# Patient Record
Sex: FEMALE | Race: WHITE | NOT HISPANIC OR LATINO | Employment: FULL TIME | URBAN - METROPOLITAN AREA
[De-identification: names, ages, dates, MRNs, and addresses within clinical notes are randomized per-mention and may not be internally consistent; named-entity substitution may affect disease eponyms.]

---

## 2017-01-04 ENCOUNTER — PRENATAL OFFICE VISIT (OUTPATIENT)
Dept: OBGYN | Facility: CLINIC | Age: 37
End: 2017-01-04
Payer: COMMERCIAL

## 2017-01-04 VITALS — SYSTOLIC BLOOD PRESSURE: 122 MMHG | BODY MASS INDEX: 27.97 KG/M2 | WEIGHT: 163 LBS | DIASTOLIC BLOOD PRESSURE: 82 MMHG

## 2017-01-04 DIAGNOSIS — F41.9 ANXIETY: ICD-10-CM

## 2017-01-04 DIAGNOSIS — O09.523 SUPERVISION OF HIGH-RISK PREGNANCY OF ELDERLY MULTIGRAVIDA, THIRD TRIMESTER: Primary | ICD-10-CM

## 2017-01-04 DIAGNOSIS — O98.519 HERPES INFECTION DURING PREGNANCY, ANTEPARTUM: ICD-10-CM

## 2017-01-04 DIAGNOSIS — Z36.85 ANTENATAL SCREENING FOR STREPTOCOCCUS B: ICD-10-CM

## 2017-01-04 DIAGNOSIS — B00.9 HERPES INFECTION DURING PREGNANCY, ANTEPARTUM: ICD-10-CM

## 2017-01-04 PROCEDURE — 99000 SPECIMEN HANDLING OFFICE-LAB: CPT | Performed by: OBSTETRICS & GYNECOLOGY

## 2017-01-04 PROCEDURE — 87653 STREP B DNA AMP PROBE: CPT | Mod: 90 | Performed by: OBSTETRICS & GYNECOLOGY

## 2017-01-04 PROCEDURE — 99207 ZZC PRENATAL VISIT: CPT | Performed by: OBSTETRICS & GYNECOLOGY

## 2017-01-04 RX ORDER — VALACYCLOVIR HYDROCHLORIDE 500 MG/1
TABLET, FILM COATED ORAL
Qty: 30 TABLET | Refills: 0 | Status: SHIPPED | OUTPATIENT
Start: 2017-01-04 | End: 2017-03-01

## 2017-01-04 NOTE — MR AVS SNAPSHOT
"              After Visit Summary   2017    Rosaline Jimenez    MRN: [id number]           Patient Information     Date Of Birth          1980        Visit Information        Provider Department      2017 8:40 AM Anastasiia Hummel MD [facility] [place]        Today's Diagnoses     Supervision of high-risk pregnancy of elderly multigravida, third trimester    -  1      screening for streptococcus B            Follow-ups after your visit        Who to contact     If you have questions or need follow up information about today's clinic visit or your schedule please contact [facility] directly at [phone].  Normal or non-critical lab and imaging results will be communicated to you by [employer], letter or phone within 4 business days after the clinic has received the results. If you do not hear from us within 7 days, please contact the clinic through [employer] or phone. If you have a critical or abnormal lab result, we will notify you by phone as soon as possible.  Submit refill requests through [employer] or call your pharmacy and they will forward the refill request to us. Please allow 3 business days for your refill to be completed.          Additional Information About Your Visit        MyChart Information     [employer] lets you send messages to your doctor, view your test results, renew your prescriptions, schedule appointments and more. To sign up, go to [email or web address] . Click on \"Log in\" on the left side of the screen, which will take you to the Welcome page. Then click on \"Sign up Now\" on the right side of the page.     You will be asked to enter the access code listed below, as well as some personal information. Please follow the directions to create your username and password.     Your access code is: [email or web address]  Expires: 2017 12:11 PM     Your access code will  in 90 days. If you need help or a new code, please call your [facility] or " [phone].        Care EveryWhere ID     This is your Care EveryWhere ID. This could be used by other organizations to access your [facility] medical records  [id number]        Your Vitals Were     Last Period                   04/25/2016            Blood Pressure from Last 3 Encounters:   01/04/17 122/82   12/21/16 108/66   12/07/16 122/60    Weight from Last 3 Encounters:   01/04/17 163 lb (73.936 kg)   12/21/16 161 lb (73.029 kg)   12/07/16 157 lb (71.215 kg)              Today, you had the following     No orders found for display       Primary Care Provider Office Phone # Fax #    Anastasiia Hummel [phone]       [facility] [address] [address]        Thank you!     Thank you for choosing [facility]  for your care. Our goal is always to provide you with excellent care. Hearing back from our patients is one way we can continue to improve our services. Please take a few minutes to complete the written survey that you may receive in the mail after your visit with us. Thank you!             Your Updated Medication List - Protect others around you: Learn how to safely use, store and throw away your medicines at www.disposemymeds.org.          This list is accurate as of: 1/4/17 10:01 AM.  Always use your most recent med list.                   Brand Name Dispense Instructions for use    lidocaine 5 % Patch    LIDODERM    30 patch    Apply up to 3 patches to painful area at once for up to 12 h within a 24 h period.  Remove after 12 hours.       Prenatal Vitamins 28-0.8 MG Tabs          VALTREX 500 MG tablet   Generic drug:  valACYclovir     14 tablet    Take 1 tablet (500 mg) by mouth 2 times daily       ZOLOFT 100 MG tablet   Generic drug:  sertraline     30 tablet    Take 200 mg by mouth daily

## 2017-01-04 NOTE — PROGRESS NOTES
Having a lot of BH now. Had one night that had them q2-6 minutes and very tight and uncomfortable. No VB or LOF but it got them motivated to get some things done and bags packed. Since then just here and there on Bh. Baby is active. The lido patches worked for rib pain and now is fine. Right hip still bothering her but tolerable. Anxiety is still under good control with just the sertraline and no benzos at all. No HAs at all really these last few months so much better this preg than last. GBS done today. CVX is a FT and at first felt very efface but then noted it funnels along the length of the head so thicker than at first appearance. Discussed induction if wanted after 39 weeks. She is somewhat hesitant but Ole really prefers not doing it so will see how she's feeling in another couple of weeks and if cvx is favorable. Return 1 wk

## 2017-01-05 LAB
GP B STREP DNA SPEC QL NAA+PROBE: NORMAL
SPECIMEN SOURCE: NORMAL

## 2017-01-11 ENCOUNTER — TELEPHONE (OUTPATIENT)
Dept: OBGYN | Facility: CLINIC | Age: 37
End: 2017-01-11

## 2017-01-11 NOTE — TELEPHONE ENCOUNTER
Was seen by one of her partners - blood pressure 114/76, cervical 140, -2 ,  hgb 11.1    --- everything going good. Olivier more since her cervical check.   Wants to know if she needs to still come in to clinic today?

## 2017-01-11 NOTE — TELEPHONE ENCOUNTER
Whatever she prefers. i'm totally fine with what she had her partner do and am fine to skip it if she's comfortable with that

## 2017-01-11 NOTE — TELEPHONE ENCOUNTER
Pt is 37 weeks pregnant and she forgot to schedule her appointment with Dr. Hummel. She was worried she would not be able to get in with her as her schedule is busy. 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 Pt states her BP was 114/76, Hgb 11.1, NST reactive with baseline 130bpm, cervical exam 1cm 40% effaced and -2 station. Pt is wanting to inform Dr. Hummel of her exam her partner did, and to see if she still wanted to see her this week. Pt does have appt scheduled for the next two weeks and does plan to come in. Note routed to Dr. Hummel to review and advise.

## 2017-01-13 ENCOUNTER — HOSPITAL ENCOUNTER (INPATIENT)
Facility: CLINIC | Age: 37
LOS: 1 days | Discharge: HOME | End: 2017-01-14
Attending: OBSTETRICS & GYNECOLOGY | Admitting: OBSTETRICS & GYNECOLOGY
Payer: COMMERCIAL

## 2017-01-13 ENCOUNTER — TELEPHONE (OUTPATIENT)
Dept: NURSING | Facility: CLINIC | Age: 37
End: 2017-01-13

## 2017-01-13 ENCOUNTER — ANESTHESIA (OUTPATIENT)
Dept: OBGYN | Facility: CLINIC | Age: 37
End: 2017-01-13
Payer: COMMERCIAL

## 2017-01-13 ENCOUNTER — ANESTHESIA EVENT (OUTPATIENT)
Dept: OBGYN | Facility: CLINIC | Age: 37
End: 2017-01-13
Payer: COMMERCIAL

## 2017-01-13 PROBLEM — Z34.90 PREGNANCY: Status: ACTIVE | Noted: 2017-01-13

## 2017-01-13 LAB
ABO + RH BLD: NORMAL
ABO + RH BLD: NORMAL
BASOPHILS # BLD AUTO: 0 10E9/L (ref 0–0.2)
BASOPHILS NFR BLD AUTO: 0.2 %
DIFFERENTIAL METHOD BLD: NORMAL
EOSINOPHIL # BLD AUTO: 0.1 10E9/L (ref 0–0.7)
EOSINOPHIL NFR BLD AUTO: 1.3 %
ERYTHROCYTE [DISTWIDTH] IN BLOOD BY AUTOMATED COUNT: 14.7 % (ref 10–15)
HCT VFR BLD AUTO: 36.1 % (ref 35–47)
HGB BLD-MCNC: 12.2 G/DL (ref 11.7–15.7)
IMM GRANULOCYTES # BLD: 0 10E9/L (ref 0–0.4)
IMM GRANULOCYTES NFR BLD: 0.3 %
LYMPHOCYTES # BLD AUTO: 1.9 10E9/L (ref 0.8–5.3)
LYMPHOCYTES NFR BLD AUTO: 18.9 %
MCH RBC QN AUTO: 28.4 PG (ref 26.5–33)
MCHC RBC AUTO-ENTMCNC: 33.8 G/DL (ref 31.5–36.5)
MCV RBC AUTO: 84 FL (ref 78–100)
MONOCYTES # BLD AUTO: 0.4 10E9/L (ref 0–1.3)
MONOCYTES NFR BLD AUTO: 3.9 %
NEUTROPHILS # BLD AUTO: 7.6 10E9/L (ref 1.6–8.3)
NEUTROPHILS NFR BLD AUTO: 75.4 %
NRBC # BLD AUTO: 0 10*3/UL
NRBC BLD AUTO-RTO: 0 /100
PLATELET # BLD AUTO: 222 10E9/L (ref 150–450)
RBC # BLD AUTO: 4.3 10E12/L (ref 3.8–5.2)
SPECIMEN EXP DATE BLD: NORMAL
T PALLIDUM IGG+IGM SER QL: NEGATIVE
WBC # BLD AUTO: 10.1 10E9/L (ref 4–11)

## 2017-01-13 PROCEDURE — 86780 TREPONEMA PALLIDUM: CPT | Performed by: OBSTETRICS & GYNECOLOGY

## 2017-01-13 PROCEDURE — 59400 OBSTETRICAL CARE: CPT | Performed by: OBSTETRICS & GYNECOLOGY

## 2017-01-13 PROCEDURE — 85025 COMPLETE CBC W/AUTO DIFF WBC: CPT | Performed by: OBSTETRICS & GYNECOLOGY

## 2017-01-13 PROCEDURE — 25800025 ZZH RX 258: Performed by: OBSTETRICS & GYNECOLOGY

## 2017-01-13 PROCEDURE — 37000011 ZZH ANESTHESIA WARD SERVICE

## 2017-01-13 PROCEDURE — 25000125 ZZHC RX 250: Performed by: OBSTETRICS & GYNECOLOGY

## 2017-01-13 PROCEDURE — 25000132 ZZH RX MED GY IP 250 OP 250 PS 637: Performed by: OBSTETRICS & GYNECOLOGY

## 2017-01-13 PROCEDURE — 3E0R3CZ INTRODUCTION OF REGIONAL ANESTHETIC INTO SPINAL CANAL, PERCUTANEOUS APPROACH: ICD-10-PCS | Performed by: ANESTHESIOLOGY

## 2017-01-13 PROCEDURE — 25000130 H RX MED GY IP 250 OP 259 PS 637: Performed by: OBSTETRICS & GYNECOLOGY

## 2017-01-13 PROCEDURE — 36415 COLL VENOUS BLD VENIPUNCTURE: CPT | Performed by: OBSTETRICS & GYNECOLOGY

## 2017-01-13 PROCEDURE — 25000125 ZZHC RX 250: Performed by: ANESTHESIOLOGY

## 2017-01-13 PROCEDURE — 72200001 ZZH LABOR CARE VAGINAL DELIVERY SINGLE

## 2017-01-13 PROCEDURE — 86900 BLOOD TYPING SEROLOGIC ABO: CPT | Performed by: OBSTETRICS & GYNECOLOGY

## 2017-01-13 PROCEDURE — 12000037 ZZH R&B POSTPARTUM INTERMEDIATE

## 2017-01-13 PROCEDURE — 00HU33Z INSERTION OF INFUSION DEVICE INTO SPINAL CANAL, PERCUTANEOUS APPROACH: ICD-10-PCS | Performed by: ANESTHESIOLOGY

## 2017-01-13 PROCEDURE — 86901 BLOOD TYPING SEROLOGIC RH(D): CPT | Performed by: OBSTETRICS & GYNECOLOGY

## 2017-01-13 RX ORDER — SODIUM CHLORIDE, SODIUM LACTATE, POTASSIUM CHLORIDE, CALCIUM CHLORIDE 600; 310; 30; 20 MG/100ML; MG/100ML; MG/100ML; MG/100ML
INJECTION, SOLUTION INTRAVENOUS CONTINUOUS
Status: DISCONTINUED | OUTPATIENT
Start: 2017-01-13 | End: 2017-01-13

## 2017-01-13 RX ORDER — DIPHENHYDRAMINE HCL 25 MG
50 CAPSULE ORAL
Status: DISCONTINUED | OUTPATIENT
Start: 2017-01-13 | End: 2017-01-14 | Stop reason: HOSPADM

## 2017-01-13 RX ORDER — CARBOPROST TROMETHAMINE 250 UG/ML
250 INJECTION, SOLUTION INTRAMUSCULAR
Status: DISCONTINUED | OUTPATIENT
Start: 2017-01-13 | End: 2017-01-13

## 2017-01-13 RX ORDER — OXYCODONE AND ACETAMINOPHEN 5; 325 MG/1; MG/1
1 TABLET ORAL
Status: DISCONTINUED | OUTPATIENT
Start: 2017-01-13 | End: 2017-01-13

## 2017-01-13 RX ORDER — IBUPROFEN 800 MG/1
800 TABLET, FILM COATED ORAL
Status: DISCONTINUED | OUTPATIENT
Start: 2017-01-13 | End: 2017-01-13

## 2017-01-13 RX ORDER — METHYLERGONOVINE MALEATE 0.2 MG/ML
200 INJECTION INTRAVENOUS
Status: DISCONTINUED | OUTPATIENT
Start: 2017-01-13 | End: 2017-01-13

## 2017-01-13 RX ORDER — ROPIVACAINE HYDROCHLORIDE 2 MG/ML
10 INJECTION, SOLUTION EPIDURAL; INFILTRATION; PERINEURAL ONCE
Status: COMPLETED | OUTPATIENT
Start: 2017-01-13 | End: 2017-01-13

## 2017-01-13 RX ORDER — NALBUPHINE HYDROCHLORIDE 10 MG/ML
2.5-5 INJECTION, SOLUTION INTRAMUSCULAR; INTRAVENOUS; SUBCUTANEOUS EVERY 6 HOURS PRN
Status: DISCONTINUED | OUTPATIENT
Start: 2017-01-13 | End: 2017-01-13

## 2017-01-13 RX ORDER — AMOXICILLIN 250 MG
1-2 CAPSULE ORAL 2 TIMES DAILY
Status: DISCONTINUED | OUTPATIENT
Start: 2017-01-13 | End: 2017-01-14 | Stop reason: HOSPADM

## 2017-01-13 RX ORDER — OXYTOCIN 10 [USP'U]/ML
10 INJECTION, SOLUTION INTRAMUSCULAR; INTRAVENOUS
Status: DISCONTINUED | OUTPATIENT
Start: 2017-01-13 | End: 2017-01-13

## 2017-01-13 RX ORDER — MISOPROSTOL 200 UG/1
400 TABLET ORAL
Status: DISCONTINUED | OUTPATIENT
Start: 2017-01-13 | End: 2017-01-14 | Stop reason: HOSPADM

## 2017-01-13 RX ORDER — ACETAMINOPHEN 325 MG/1
650 TABLET ORAL EVERY 4 HOURS PRN
Status: DISCONTINUED | OUTPATIENT
Start: 2017-01-13 | End: 2017-01-14 | Stop reason: HOSPADM

## 2017-01-13 RX ORDER — IBUPROFEN 400 MG/1
400-800 TABLET, FILM COATED ORAL EVERY 6 HOURS PRN
Status: DISCONTINUED | OUTPATIENT
Start: 2017-01-13 | End: 2017-01-14 | Stop reason: HOSPADM

## 2017-01-13 RX ORDER — PRENATAL VIT/IRON FUM/FOLIC AC 27MG-0.8MG
1 TABLET ORAL DAILY
Status: DISCONTINUED | OUTPATIENT
Start: 2017-01-13 | End: 2017-01-14 | Stop reason: HOSPADM

## 2017-01-13 RX ORDER — OXYTOCIN/0.9 % SODIUM CHLORIDE 30/500 ML
340 PLASTIC BAG, INJECTION (ML) INTRAVENOUS CONTINUOUS PRN
Status: DISCONTINUED | OUTPATIENT
Start: 2017-01-13 | End: 2017-01-14 | Stop reason: HOSPADM

## 2017-01-13 RX ORDER — ONDANSETRON 2 MG/ML
4 INJECTION INTRAMUSCULAR; INTRAVENOUS EVERY 6 HOURS PRN
Status: DISCONTINUED | OUTPATIENT
Start: 2017-01-13 | End: 2017-01-13

## 2017-01-13 RX ORDER — EPHEDRINE SULFATE 50 MG/ML
5 INJECTION, SOLUTION INTRAMUSCULAR; INTRAVENOUS; SUBCUTANEOUS
Status: DISCONTINUED | OUTPATIENT
Start: 2017-01-13 | End: 2017-01-13

## 2017-01-13 RX ORDER — OXYTOCIN/0.9 % SODIUM CHLORIDE 30/500 ML
100-340 PLASTIC BAG, INJECTION (ML) INTRAVENOUS CONTINUOUS PRN
Status: COMPLETED | OUTPATIENT
Start: 2017-01-13 | End: 2017-01-13

## 2017-01-13 RX ORDER — HYDROCORTISONE 2.5 %
CREAM (GRAM) TOPICAL 3 TIMES DAILY PRN
Status: DISCONTINUED | OUTPATIENT
Start: 2017-01-13 | End: 2017-01-14 | Stop reason: HOSPADM

## 2017-01-13 RX ORDER — OXYTOCIN 10 [USP'U]/ML
10 INJECTION, SOLUTION INTRAMUSCULAR; INTRAVENOUS
Status: DISCONTINUED | OUTPATIENT
Start: 2017-01-13 | End: 2017-01-14 | Stop reason: HOSPADM

## 2017-01-13 RX ORDER — NALOXONE HYDROCHLORIDE 0.4 MG/ML
.1-.4 INJECTION, SOLUTION INTRAMUSCULAR; INTRAVENOUS; SUBCUTANEOUS
Status: DISCONTINUED | OUTPATIENT
Start: 2017-01-13 | End: 2017-01-14 | Stop reason: HOSPADM

## 2017-01-13 RX ORDER — NALOXONE HYDROCHLORIDE 0.4 MG/ML
.1-.4 INJECTION, SOLUTION INTRAMUSCULAR; INTRAVENOUS; SUBCUTANEOUS
Status: DISCONTINUED | OUTPATIENT
Start: 2017-01-13 | End: 2017-01-13

## 2017-01-13 RX ORDER — FENTANYL CITRATE 50 UG/ML
50-100 INJECTION, SOLUTION INTRAMUSCULAR; INTRAVENOUS
Status: DISCONTINUED | OUTPATIENT
Start: 2017-01-13 | End: 2017-01-13

## 2017-01-13 RX ORDER — FENTANYL CITRATE 50 UG/ML
100 INJECTION, SOLUTION INTRAMUSCULAR; INTRAVENOUS ONCE
Status: COMPLETED | OUTPATIENT
Start: 2017-01-13 | End: 2017-01-13

## 2017-01-13 RX ORDER — OXYCODONE HYDROCHLORIDE 5 MG/1
5-10 TABLET ORAL
Status: DISCONTINUED | OUTPATIENT
Start: 2017-01-13 | End: 2017-01-14 | Stop reason: HOSPADM

## 2017-01-13 RX ORDER — ACETAMINOPHEN 325 MG/1
650 TABLET ORAL EVERY 4 HOURS PRN
Status: DISCONTINUED | OUTPATIENT
Start: 2017-01-13 | End: 2017-01-13

## 2017-01-13 RX ORDER — OXYTOCIN/0.9 % SODIUM CHLORIDE 30/500 ML
100 PLASTIC BAG, INJECTION (ML) INTRAVENOUS CONTINUOUS
Status: DISCONTINUED | OUTPATIENT
Start: 2017-01-13 | End: 2017-01-14 | Stop reason: HOSPADM

## 2017-01-13 RX ORDER — BISACODYL 10 MG
10 SUPPOSITORY, RECTAL RECTAL DAILY PRN
Status: DISCONTINUED | OUTPATIENT
Start: 2017-01-15 | End: 2017-01-14 | Stop reason: HOSPADM

## 2017-01-13 RX ORDER — LIDOCAINE 40 MG/G
CREAM TOPICAL
Status: DISCONTINUED | OUTPATIENT
Start: 2017-01-13 | End: 2017-01-13

## 2017-01-13 RX ORDER — LANOLIN 100 %
OINTMENT (GRAM) TOPICAL
Status: DISCONTINUED | OUTPATIENT
Start: 2017-01-13 | End: 2017-01-14 | Stop reason: HOSPADM

## 2017-01-13 RX ADMIN — Medication 12 ML/HR: at 11:15

## 2017-01-13 RX ADMIN — IBUPROFEN 800 MG: 400 TABLET ORAL at 22:52

## 2017-01-13 RX ADMIN — SENNOSIDES AND DOCUSATE SODIUM 1 TABLET: 8.6; 5 TABLET ORAL at 20:02

## 2017-01-13 RX ADMIN — FENTANYL CITRATE 100 MCG: 50 INJECTION, SOLUTION INTRAMUSCULAR; INTRAVENOUS at 11:10

## 2017-01-13 RX ADMIN — SODIUM CHLORIDE, POTASSIUM CHLORIDE, SODIUM LACTATE AND CALCIUM CHLORIDE 1000 ML: 600; 310; 30; 20 INJECTION, SOLUTION INTRAVENOUS at 10:25

## 2017-01-13 RX ADMIN — IBUPROFEN 800 MG: 400 TABLET ORAL at 16:59

## 2017-01-13 RX ADMIN — SODIUM CHLORIDE, POTASSIUM CHLORIDE, SODIUM LACTATE AND CALCIUM CHLORIDE: 600; 310; 30; 20 INJECTION, SOLUTION INTRAVENOUS at 11:04

## 2017-01-13 RX ADMIN — DIPHENHYDRAMINE HYDROCHLORIDE 50 MG: 25 CAPSULE ORAL at 23:20

## 2017-01-13 RX ADMIN — Medication 340 ML/HR: at 15:02

## 2017-01-13 RX ADMIN — ACETAMINOPHEN 650 MG: 325 TABLET, FILM COATED ORAL at 22:52

## 2017-01-13 RX ADMIN — ROPIVACAINE HYDROCHLORIDE 10 ML: 2 INJECTION, SOLUTION EPIDURAL; INFILTRATION at 11:10

## 2017-01-13 NOTE — L&D DELIVERY NOTE
of viable female at 1458   Baby's weight 7lbs. 3oz.     Apgars unavailable     Clear AF  Tiny vaginal abrasion not repaired  Normal placenta    Primary OB: Shaheed

## 2017-01-13 NOTE — H&P
No significant change in general health status based on examination of the patient, review of Nursing Admission Database and prenatal record.    EFW: 6lb

## 2017-01-13 NOTE — PLAN OF CARE
1100:  Dr. Latif at bedside for epidural placement.    1230: Dr. Hummel at bedside to see Pt and AROM. Clear fluid noted.    1340: Pt feeling some rectal pressure. SVE 6+/09/-2    1435: Feeling more pressure and starting to breathe with ctxs. SVE: 10/100/0. Dr. Hummel called to attend delivery. Milner removed.    1442: Dr. Hummel at bedside.    1445: Started pushing.    1458:  of baby girl @ 1458 by Dr. Hummel.  RN and MD at bedside during entire pushing time.    1502: Placenta/Oxytocin started.

## 2017-01-13 NOTE — TELEPHONE ENCOUNTER
Pt called and stated she is in labor. Informed pt to go to MAC at [facility]. Dr. Hummel informed. MAC at [facility] Called.

## 2017-01-13 NOTE — ANESTHESIA PROCEDURE NOTES
Peripheral nerve/Neuraxial procedure note : epidural catheter  Pre-Procedure  Performed by IRISH PAULINO  Location: OB      Pre-Anesthestic Checklist: patient identified, IV checked, risks and benefits discussed, informed consent, monitors and equipment checked, pre-op evaluation and at physician/surgeon's request    Timeout  Correct Patient: Yes   Correct Procedure: Yes   Correct Site: Yes   Correct Laterality: N/A   Correct Position: Yes   Site Marked: N/A   .   Procedure Documentation    .    Procedure:    Epidural catheter.  Insertion Site:L3-4  (midline approach) Injection technique: LORT saline   Local skin infiltrated with 3 mL of 1% lidocaine.  ALEYDA at 5 cm     Patient Prep;mask, sterile gloves, povidone-iodine 7.5% surgical scrub, patient draped.  .  Needle: Touhy needle (17 G. 3.5 in). # of attempts: 1. # of redirects:: 0..  Spinal Needle: . . . Catheter: 19 G .  .  .  Catheter threaded easily, 9 at the skin and 4 cm in the epidural space.     Assessment/Narrative  Paresthesias: No.  .  .  Aspiration negative for heme or CSF  . Test dose of 3 mL lidocaine 1.5% w/ 1:200,000 epinephrine at. Test dose negative for signs of intravascular, subdural or intrathecal injection. Comments:  Pt tolerated well.   Immediately to supine with JASON.   FHTs stable post-procedure.   No complications.

## 2017-01-13 NOTE — PLAN OF CARE
Problem: Labor (Cervical Ripen, Induct, Augment) (Adult,Obstetrics,Pediatric)  Goal: Signs and Symptoms of Listed Potential Problems Will be Absent or Manageable (Labor)  Signs and symptoms of listed potential problems will be absent or manageable by discharge/transition of care (reference Labor (Cervical Ripen, Induct, Augment) (Adult,Obstetrics,Pediatric) CPG).  Outcome: Completed Date Met:  17   @ 5127 of baby girl by Dr. Hummel. See delivery record for further details.

## 2017-01-13 NOTE — IP AVS SNAPSHOT
MRN:[id number]                      After Visit Summary   1/13/2017    Rosaline Jimenez    MRN: [id number]           Thank you!     Thank you for choosing [facility] for your care. Our goal is always to provide you with excellent care. Hearing back from our patients is one way we can continue to improve our services. Please take a few minutes to complete the written survey that you may receive in the mail after you visit with us. Thank you!        Patient Information     Date Of Birth          1980        About your hospital stay     You were admitted on:  January 13, 2017 You last received care in the:  [address]    You were discharged on:  January 14, 2017       Who to Call     For medical emergencies, please call 911.  For non-urgent questions about your medical care, please call your primary care provider or clinic, [phone]          Attending Provider     Provider    Anastasiia Hummel MD       Primary Care Provider Office Phone # Fax #    Anastasiia Hummel [phone]       [facility] [address] [place] [address] [address]  [facility] [address]        After Care Instructions     Activity       Review discharge instructions            Diet       Resume previous diet            Discharge Instructions - Postpartum visit       Schedule postpartum visit with your provider and return to clinic in 6 weeks.                  Your next 10 appointments already scheduled     Jan 18, 2017  9:50 AM   ESTABLISHED PRENATAL with Anastasiia Hummel MD   [facility] ([facility])    [address] [place] [address] 100  [facility] [id number] [phone]            Jan 23, 2017  2:50 PM   ESTABLISHED PRENATAL with Anastasiia Hummel MD   [facility] [place] ([facility])    [address] [facility] [address]  [facility] [id number] [phone]              Further instructions from your care team        Postpartum Vaginal Delivery Instructions    Activity       Ask family and friends for help when you need it.    Do not place anything in your vagina for 6 weeks.    You are not restricted on other activities, but take it easy for a few weeks to allow your body to recover from delivery.  You are able to do any activities you feel up to that point.    No driving until you have stopped taking your pain medications (usually two weeks after delivery).     Call your health care provider if you have any of these symptoms:       Increased pain, swelling, redness, or fluid around your stiches from an episiotomy or perineal tear.    A fever above 100.4 F (38 C) with or without chills when placing a thermometer under your tongue.    You soak a sanitary pad with blood within 1 hour, or you see blood clots larger than a golf ball.    Bleeding that lasts more than 6 weeks.    Vaginal discharge that smells bad.    Severe pain, cramping or tenderness in your lower belly area.    A need to urinate more frequently (use the toilet more often), more urgently (use the toilet very quickly), or it burns when you urinate.    Nausea and vomiting.    Redness, swelling or pain around a vein in your leg.    Problems breastfeeding or a red or painful area on your breast.    Chest pain and cough or are gasping for air.    Problems coping with sadness, anxiety, or depression.  If you have any concerns about hurting yourself or the baby, call your provider immediately.     You have questions or concerns after you return home.     Keep your hands clean:  Always wash your hands before touching your perineal area and stitches.  This helps reduce your risk of infection.  If your hands aren't dirty, you may use an alcohol hand-rub to clean your hands. Keep your nails clean and short.        Pending Results     No orders found for last 2 day(s).            Statement of Approval     Ordered          01/14/17 0929  I have reviewed and agree with all the  "recommendations and orders detailed in this document.   EFFECTIVE NOW     Approved and electronically signed by:  Chayo Gilliland MD             Admission Information        Provider Department Dept Phone    2017 Anastasiia Hummel MD  [facility] [phone]      Your Vitals Were     Blood Pressure Temperature Respirations    128/80 mmHg 98.1  F (36.7  C) (Oral) 16    Height Weight BMI (Body Mass Index)    1.626 m (5' 4.02\") 73.936 kg (163 lb) 27.97 kg/m2    Pulse Oximetry Last Period       95% 2016       [employer] Information     [employer] lets you send messages to your doctor, view your test results, renew your prescriptions, schedule appointments and more. To sign up, go to [email or web address] . Click on \"Log in\" on the left side of the screen, which will take you to the Welcome page. Then click on \"Sign up Now\" on the right side of the page.     You will be asked to enter the access code listed below, as well as some personal information. Please follow the directions to create your username and password.     Your access code is: [email or web address]  Expires: 2017 12:11 PM     Your access code will  in 90 days. If you need help or a new code, please call your [facility] clinic or [phone].        Care EveryWhere ID     This is your Care EveryWhere ID. This could be used by other organizations to access your [facility] medical records  [id number]           Review of your medicines      CONTINUE these medicines which have NOT CHANGED        Dose / Directions    lidocaine 5 % Patch   Commonly known as:  LIDODERM   Used for:  Rib pain on right side        Apply up to 3 patches to painful area at once for up to 12 h within a 24 h period.  Remove after 12 hours.   Quantity:  30 patch   Refills:  0       Prenatal Vitamins 28-0.8 MG Tabs        Refills:  0       valACYclovir 500 MG tablet   Commonly known as:  VALTREX   Used for:  Herpes infection during pregnancy, antepartum        Take one tab " po qday   Quantity:  30 tablet   Refills:  0       ZOLOFT 100 MG tablet   Generic drug:  sertraline        Dose:  150 mg   Take 150 mg by mouth daily   Quantity:  30 tablet   Refills:  0                Protect others around you: Learn how to safely use, store and throw away your medicines at www.disposemymeds.org.             Medication List: This is a list of all your medications and when to take them. Check marks below indicate your daily home schedule. Keep this list as a reference.      Medications           Morning Afternoon Evening Bedtime As Needed    lidocaine 5 % Patch   Commonly known as:  LIDODERM   Apply up to 3 patches to painful area at once for up to 12 h within a 24 h period.  Remove after 12 hours.                                Prenatal Vitamins 28-0.8 MG Tabs                                valACYclovir 500 MG tablet   Commonly known as:  VALTREX   Take one tab po qday                                ZOLOFT 100 MG tablet   Take 150 mg by mouth daily   Last time this was given:  150 mg on 1/14/2017  8:14 AM   Generic drug:  sertraline

## 2017-01-13 NOTE — ANESTHESIA PREPROCEDURE EVALUATION
Anesthesia Plan      History & Physical Review      ASA Status:  .  OB Epidural Asa: 2            Postoperative Care      Consents                          .

## 2017-01-13 NOTE — PLAN OF CARE
Pt up to void with success. Pt transferred to [facility] with baby in her arms. Report to ISAIAS Ivey

## 2017-01-13 NOTE — IP AVS SNAPSHOT
[address]    [place] [address]    Phone:  [phone]                                       After Visit Summary   1/13/2017    Rosaline Jimenez    MRN: [id number]           After Visit Summary Signature Page     I have received my discharge instructions, and my questions have been answered. I have discussed any challenges I see with this plan with the nurse or doctor.    ..........................................................................................................................................  Patient/Patient Representative Signature      ..........................................................................................................................................  Patient Representative Print Name and Relationship to Patient    ..................................................               ................................................  Date                                            Time    ..........................................................................................................................................  Reviewed by Signature/Title    ...................................................              ..............................................  Date                                                            Time

## 2017-01-13 NOTE — PLAN OF CARE
Multip here laboring. Pt was 4cm at work before she came over.  EU/Us applied. Admission database obtained, and prenatal info reviewed.    Dr. Hummel updated of Pt's arrival, and orders received.

## 2017-01-14 VITALS
DIASTOLIC BLOOD PRESSURE: 80 MMHG | WEIGHT: 163 LBS | RESPIRATION RATE: 16 BRPM | HEIGHT: 64 IN | BODY MASS INDEX: 27.83 KG/M2 | SYSTOLIC BLOOD PRESSURE: 128 MMHG | OXYGEN SATURATION: 95 % | TEMPERATURE: 98.1 F

## 2017-01-14 PROCEDURE — 25000132 ZZH RX MED GY IP 250 OP 250 PS 637: Performed by: OBSTETRICS & GYNECOLOGY

## 2017-01-14 RX ADMIN — ACETAMINOPHEN 650 MG: 325 TABLET, FILM COATED ORAL at 10:44

## 2017-01-14 RX ADMIN — SERTRALINE HYDROCHLORIDE 150 MG: 50 TABLET ORAL at 08:14

## 2017-01-14 RX ADMIN — ACETAMINOPHEN 650 MG: 325 TABLET, FILM COATED ORAL at 04:30

## 2017-01-14 RX ADMIN — PRENATAL VIT W/ FE FUMARATE-FA TAB 27-0.8 MG 1 TABLET: 27-0.8 TAB at 08:14

## 2017-01-14 RX ADMIN — IBUPROFEN 800 MG: 400 TABLET ORAL at 04:30

## 2017-01-14 RX ADMIN — IBUPROFEN 800 MG: 400 TABLET ORAL at 10:44

## 2017-01-14 NOTE — LACTATION NOTE
Initial Lactation visit. Hand out given. Recommend unlimited, frequent breast feedings: At least 8 - 12 times every 24 hours. Avoid pacifiers and supplementation with formula unless medically indicated. Explained benefits of holding baby skin on skin to help promote better breastfeeding outcomes. Infant feeding well when interested.  Discussed second night with pt and effect supplement can have on milk supply if pt gives supplement as patient was asking about supplementing with cluster feeding.  Pt encouraged to breast feed on demand and feed baby at breast before offering supplement.  Will revisit as needed.    Milagro Mcnamara RN, IBCLC

## 2017-01-14 NOTE — PLAN OF CARE
Problem: Postpartum, Vaginal Delivery (Adult)  Goal: Signs and Symptoms of Listed Potential Problems Will be Absent or Manageable (Postpartum, Vaginal Delivery)  Signs and symptoms of listed potential problems will be absent or manageable by discharge/transition of care (reference Postpartum, Vaginal Delivery (Adult) CPG).   Outcome: Improving  Requesting early discharge today. Minimial c/o discomfort; occasional cramping while nursing. Independent with baby cares and supportive  at bedside. No other c/o. Breastpump given.

## 2017-01-14 NOTE — DISCHARGE INSTRUCTIONS

## 2017-01-14 NOTE — PROGRESS NOTES
[facility]    Post-Partum Progress Note    Assessment and Plan  Assessment:  Post-partum day #1  Normal spontaneous vaginal delivery    Doing well.  No excessive bleeding  Pain well-controlled.    Plan:  Ambulation encouraged  Lactation consultation  Pain control measures as needed  Possible discharge later today; if not, home tomorrow    Chayo Gilliland     Interval History  Doing well.  Pain is well-controlled.  No fevers.  No history of foul-smelling vaginal discharge.  Good appetite.  Denies chest pain, shortness of breath, nausea or vomiting.  Vaginal bleeding is similar to a heavy menstrual flow but slowing.  Ambulatory.  Breastfeeding well.  Had some cramping but tolerable    Medications    oxytocin in 0.9% NaCl       oxytocin in 0.9% NaCl       NO Rho (D) immune globulin (RhoGam) needed - mother Rh POSITIVE       - MEDICATION INSTRUCTIONS -       - MEDICATION INSTRUCTIONS -         prenatal multivitamin  plus iron  1 tablet Oral Daily     sertraline  150 mg Oral Daily     senna-docusate  1-2 tablet Oral BID       Physical Exam  Temp: 98.3  F (36.8  C) Temp src: Oral BP: 114/76 mmHg   Heart Rate: 64 Resp: 16 SpO2: 95 %      Filed Vitals:    01/13/17 1030   Weight: 73.936 kg (163 lb)     Vital Signs with Ranges  Temp:  [98.3  F (36.8  C)-98.8  F (37.1  C)] 98.3  F (36.8  C)  Heart Rate:  [64] 64  Resp:  [16] 16  BP: ()/(56-79) 114/76 mmHg  SpO2:  [94 %-99 %] 95 %  I/O last 3 completed shifts:  In: -   Out: 900 [Urine:600; Blood:300]    Uterine fundus is firm, non-tender and at the level of the umbilicus    Data  Recent Labs   Lab Test  01/13/17   1025  07/06/16   1059   ABO  A  A   RH   Pos   Pos   AS   --   Neg     Recent Labs   Lab Test  01/13/17   1025  11/09/16   0838   HGB  12.2  11.4*     Recent Labs   Lab Test  07/06/16   1059   RUQIGG  65

## 2017-01-14 NOTE — L&D DELIVERY NOTE
HISTORY OF PRESENT ILLNESS:  Rosaline Jimenez is a 36-year-old  2, para 1-0-0-1 whose pregnancy was followed by me.  The patient's pregnancy was complicated by advanced maternal age, but the patient did have Verifi testing done that was normal.  She also had an AFP that was normal.  She is otherwise blood type A positive, rubella immune and group B strep negative.  She has a history of anxiety that was well controlled on 150 mg of sertraline during the pregnancy.  She has had a long history of Klonopin use in the past, used it very sparingly this pregnancy and then stopped it altogether approximately a month or 2 ago because she began to worry about the effects on the baby.  Other than that, the patient does have a history of genital herpes and did take Valtrex starting at 36 weeks' gestation.  Other than that, the patient had an uncomplicated pregnancy.        Earlier this morning, although the exact time is not available, the patient began dustin while at work.  Upon her presentation to Labor & Delivery, she was found to be 4 cm dilated.  That was between 10:00 and 11:00 a.m.  She was admitted in active labor and had an epidural for pain control.  I then came over and did artificial rupture of membranes at 12:35 p.m. with copious clear fluid.  At that time, she was still 4 cm, 85% effaced and -2 station.  She made very rapid progress and became complete, although the time of that is not available.  With excellent maternal pushing efforts, she delivered a viable female infant in the left occiput anterior position.  The infant was initially delivered onto the maternal chest and abdomen, and we did delay cord clamping for approximately 30-45 seconds.  However, the infant was having decreased respiratory drive and therefore was taken to the warmer and the NICU NNP was called.  The infant did require some blow-by oxygen and some bag ventilation, but was otherwise stable.  Apgars are not available at the time of  this dictation, but her weight was 7 pounds 3 ounces.      The placenta then delivered intact with a normal 3-vessel cord at 1502.        The patient had a tiny vaginal abrasion that was not bleeding and therefore not repaired.  She did have some lingering blood clots in the uterus that caused some continued oozing.  A bimanual exploration was done, the blood clots were removed and then the uterine tone improved very much.  The total estimated blood loss was 300 cc.  Both mother and infant were doing well immediately after delivery, and there were no complications.  All sponge, lap and instrument counts were correct x2.      STAGES OF LABOR:  Times are not yet available.      DIAGNOSES:   1.  Intrauterine pregnancy at 37+4 in spontaneous labor.   2.  Epidural for pain control.   3.  Normal spontaneous vaginal delivery of a viable female infant.         SIVA GALARZA MD             D: 2017 15:23   T: 2017 14:26   MT: [id number]      Name:     LEIDA SY   MRN:      [id number]        Account:        [id number]   :      1980           Delivery Date:  2017      Document: [id number]

## 2017-01-14 NOTE — PLAN OF CARE
Problem: Goal Outcome Summary  Goal: Goal Outcome Summary  Outcome: Improving  Patient doing well overnight. Taking tylenol and ibuprofen for uterine cramping. Aqua K pad given for comfort. Hydrocortisone cream provided for hemmorhoid discomfort. Will continue to monitor.    Debbi Live  01/14/2017  4:40 AM

## 2017-01-14 NOTE — ANESTHESIA POSTPROCEDURE EVALUATION
Patient: Rosaline Jimenez    LABOR EPIDURAL  Additional Information* No procedures listed *    Diagnosis:* No pre-op diagnosis entered *  Diagnosis Additional Information: No value filed.    Anesthesia Type:  No value filed.    Note:  Anesthesia Post Evaluation    Patient location during evaluation: floor  Patient participation: Able to fully participate in evaluation  Level of consciousness: awake and alert  Pain management: adequate  Airway patency: patent  Cardiovascular status: acceptable  Respiratory status: acceptable     Anesthetic complications: None    Comments: Epidural has worn off and no complications        Last vitals:  Filed Vitals:    01/13/17 1650 01/13/17 2223 01/14/17 0928   BP: 111/65 114/76 128/80   Temp:  36.8  C (98.3  F) 36.7  C (98.1  F)   Resp:  16 16   SpO2: 95%         Electronically Signed By: Mike Mendieta MD  January 14, 2017  4:06 PM

## 2017-01-14 NOTE — PLAN OF CARE
Problem: Goal Outcome Summary  Goal: Goal Outcome Summary  Outcome: No Change  Bleeding wnl. VSS. Voiding without difficulty. Pain well managed with tylenol / ibuprofen. Plans to discharge this evening.

## 2017-02-24 ENCOUNTER — TELEPHONE (OUTPATIENT)
Dept: NURSING | Facility: CLINIC | Age: 37
End: 2017-02-24

## 2017-02-25 NOTE — TELEPHONE ENCOUNTER
Call Type: Triage Call    Presenting Problem: Pt calling she delivered on 1/13/17.  She is  nursing.  Today she developed chills, body aches and pains and now  has a temp of 102.1.  She reports both breasts are sore and tender.  Paged on call Dr. Anastasiia Hummel via [employer] at 8:27 P.M.  Triage Note:  Guideline Title: Pregnancy: Postpartum Breast Symptoms ; Pregnancy:  Postpartum Breast Symptoms  Recommended Disposition: See Provider within 4 hours  Original Inclination: Would have called clinic  Override Disposition: Call Provider Immediately  Intended Action: Call PCP/HCP  Physician Contacted: No  New onset or increasing redness, tenderness, localized warmth or swelling ?  YES  More than six weeks postpartum AND not breastfeeding ? NO  Physician Instructions:  Care Advice: List, or take, all current prescription(s), nonprescription or  alternative medication(s) to provider for evaluation.

## 2017-03-01 ENCOUNTER — PRENATAL OFFICE VISIT (OUTPATIENT)
Dept: OBGYN | Facility: CLINIC | Age: 37
End: 2017-03-01
Payer: COMMERCIAL

## 2017-03-01 VITALS
DIASTOLIC BLOOD PRESSURE: 56 MMHG | SYSTOLIC BLOOD PRESSURE: 102 MMHG | WEIGHT: 138.6 LBS | TEMPERATURE: 97.8 F | BODY MASS INDEX: 23.66 KG/M2 | HEIGHT: 64 IN

## 2017-03-01 DIAGNOSIS — F41.9 ANXIETY: ICD-10-CM

## 2017-03-01 PROBLEM — Z34.90 PREGNANCY: Status: RESOLVED | Noted: 2017-01-13 | Resolved: 2017-03-01

## 2017-03-01 PROBLEM — O98.519 HERPES INFECTION DURING PREGNANCY, ANTEPARTUM: Status: RESOLVED | Noted: 2017-01-04 | Resolved: 2017-03-01

## 2017-03-01 PROBLEM — A60.04 HERPES SIMPLEX VULVOVAGINITIS: Status: ACTIVE | Noted: 2017-03-01

## 2017-03-01 PROBLEM — B00.9 HERPES INFECTION DURING PREGNANCY, ANTEPARTUM: Status: RESOLVED | Noted: 2017-01-04 | Resolved: 2017-03-01

## 2017-03-01 PROCEDURE — 99207 ZZC POST PARTUM EXAM: CPT | Performed by: OBSTETRICS & GYNECOLOGY

## 2017-03-01 RX ORDER — SERTRALINE HYDROCHLORIDE 100 MG/1
100 TABLET, FILM COATED ORAL DAILY
Qty: 30 TABLET | COMMUNITY
Start: 2017-03-01 | End: 2023-09-13

## 2017-03-01 ASSESSMENT — ANXIETY QUESTIONNAIRES
IF YOU CHECKED OFF ANY PROBLEMS ON THIS QUESTIONNAIRE, HOW DIFFICULT HAVE THESE PROBLEMS MADE IT FOR YOU TO DO YOUR WORK, TAKE CARE OF THINGS AT HOME, OR GET ALONG WITH OTHER PEOPLE: SOMEWHAT DIFFICULT
1. FEELING NERVOUS, ANXIOUS, OR ON EDGE: SEVERAL DAYS
7. FEELING AFRAID AS IF SOMETHING AWFUL MIGHT HAPPEN: NOT AT ALL
6. BECOMING EASILY ANNOYED OR IRRITABLE: NOT AT ALL
2. NOT BEING ABLE TO STOP OR CONTROL WORRYING: NOT AT ALL
GAD7 TOTAL SCORE: 7
5. BEING SO RESTLESS THAT IT IS HARD TO SIT STILL: MORE THAN HALF THE DAYS
3. WORRYING TOO MUCH ABOUT DIFFERENT THINGS: MORE THAN HALF THE DAYS

## 2017-03-01 ASSESSMENT — PATIENT HEALTH QUESTIONNAIRE - PHQ9: 5. POOR APPETITE OR OVEREATING: MORE THAN HALF THE DAYS

## 2017-03-01 NOTE — PROGRESS NOTES
SUBJECTIVE:  Rosaline Jimenez,  is here for a postpartum visit.  She had a  on 17 delivering a healthy baby girl, named Jossy weighing 7 lbs 3 oz at term.      HPI:  Pt is overall doing well. Had a fever this last week with a 103 on Friday night and a 100 on . Chills and body aches. Had a cough but mild and has had it for months. No other flu sx. No breast sx like redness or warmth or other obvious mastitis sx. Did get her started on tamiflu and took it for 3 of 5 days. No fevers since . Not sure if flu and caught it really early or if maybe engorgement b/c making a lot of milk but seems late for engorgement fevers at 5-6 weeks pospartum.  S.A yesterday and not overly painful. No urinary incontinence issues.  Feels like if passes gas sometimes it is vaginal. No other prolapse sx.  Baby is much more calm than Miloh was. Still fussy in the evning but slept 11-4 last night and didn't eat from 9 to 4 so hoping that will get easier. Ole very helpful around the house and with Miloh but easily frustrated that can't help with the baby.  Still doing 200mg sertraline and feels like that is really helping. Hasn't gone back on her klonopin and isn't going to. Has been on it for 15 years until mid pregnancy this time and just feels like she convinced herself she needed it and now she doesn't and so much happier.    Last PHQ-9 score on record=   PHQ-9 SCORE 3/1/2017   Total Score 8     GINA-7 SCORE 2015 2016 3/1/2017   Total Score 7 5 7       Delivery complications:  No  Breast feeding: Yes, going well. Has engorgement and had fevers over the weekend. Possible mastitis  Bladder problems: No  Bowel problems/hemorrhoids: No  Episiotomy/laceration/incision healed? No  Vaginal flow: None  [place]: Yes  Contraception: Pull out method. Patient is coming back for IUD insertion  Emotional adjustment: doing well, happy and tired  Back to work: Last week of  point review of systems negative  "other than symptoms noted below.  Constitutional: Fatigue  Psychiatric: Anxiety    OBJECTIVE:  Vitals: /56  Temp 97.8  F (36.6  C) (Oral)  Ht 5' 4\" (1.626 m)  Wt 138 lb 9.6 oz (62.9 kg)  LMP 04/25/2016  BMI 23.79 kg/m2  BMI= Body mass index is 23.79 kg/(m^2).  General - pleasant female in no acute distress.  Breast - Deferred.  Abdomen - soft, nontender, nondistended, no hepatosplenomegaly.  Pelvic - EG: normal adult female, BUS: within normal limits, Vagina: well rugated, no discharge, Cervix: no lesions or CMT, Uterus: firm, normal sized and nontender, anteverted in position. Adnexae: no masses or tenderness.  Rectovaginal - deferred.    ASSESSMENT:    ICD-10-CM    1. Postpartum care and examination immediately after delivery Z39.0    2. Anxiety F41.9        PLAN:  May resume normal activities without restrictions.  Pap smear was not done today but due next year.  Unclear what her fevers were from but no signs of mastitis and seems very unlikely that engorgement this late. However could have been or did just get at the flu earlier rather than later and helped with sx.   Continue to f/u with her [facility] provider/therapist and encouraged to stay off the klonopin and just do sertraline  Will rtn in 2-3 weeks for Mirena insert. Has had it before her kids and liked it but insertion was really painful.  Full counseling was provided, and all questions were answered.   Return to clinic in one year for an annual visit.     Anastasiia Hummel MD      "

## 2017-03-01 NOTE — MR AVS SNAPSHOT
"              After Visit Summary   3/1/2017    Rosaline Jimenez    MRN: [id number]           Patient Information     Date Of Birth          1980        Visit Information        Provider Department      3/1/2017 10:40 AM Anastasiia Hummel MD [facility]        Today's Diagnoses     Postpartum care and examination immediately after delivery    -  1    Anxiety           Follow-ups after your visit        Your next 10 appointments already scheduled     Mar 13, 2017  8:40 AM CDT   Office Visit with Anastasiia Hummel MD   [facility] ([facility])    [address] [place] [id number]   [phone]           Bring a current list of meds and any records pertaining to this visit.  For Physicals, please bring immunization records and any forms needing to be filled out.  Please arrive 10 minutes early to complete paperwork.              Who to contact     If you have questions or need follow up information about today's clinic visit or your schedule please contact [facility] directly at [phone].  Normal or non-critical lab and imaging results will be communicated to you by [employer], letter or phone within 4 business days after the clinic has received the results. If you do not hear from us within 7 days, please contact the clinic through [employer] or phone. If you have a critical or abnormal lab result, we will notify you by phone as soon as possible.  Submit refill requests through [employer] or call your pharmacy and they will forward the refill request to us. Please allow 3 business days for your refill to be completed.          Additional Information About Your Visit        [employer] Information     [employer] lets you send messages to your doctor, view your test results, renew your prescriptions, schedule appointments and more. To sign up, go to [email or web address] . Click on \"Log in\" on the left side of the screen, which " "will take you to the Welcome page. Then click on \"Sign up Now\" on the right side of the page.     You will be asked to enter the access code listed below, as well as some personal information. Please follow the directions to create your username and password.     Your access code is: [email or web address]  Expires: 2017 12:11 PM     Your access code will  in 90 days. If you need help or a new code, please call your [place] clinic or [phone].        Care EveryWhere ID     This is your Care EveryWhere ID. This could be used by other organizations to access your [place] medical records  [id number]        Your Vitals Were     Temperature Height Last Period BMI (Body Mass Index)          97.8  F (36.6  C) (Oral) 5' 4\" (1.626 m) 2016 23.79 kg/m2         Blood Pressure from Last 3 Encounters:   17 102/56   17 128/80   17 122/82    Weight from Last 3 Encounters:   17 138 lb 9.6 oz (62.9 kg)   17 163 lb (73.9 kg)   17 163 lb (73.9 kg)              Today, you had the following     No orders found for display         Today's Medication Changes          These changes are accurate as of: 3/1/17 12:11 PM.  If you have any questions, ask your nurse or doctor.               These medicines have changed or have updated prescriptions.        Dose/Directions    ZOLOFT 100 MG tablet   This may have changed:    - how much to take  - how to take this  - when to take this  - additional instructions   Generic drug:  sertraline   Changed by:  Anastasiia Hummel MD        Take 2 tabs po qday   Quantity:  30 tablet   Refills:  0         Stop taking these medicines if you haven't already. Please contact your care team if you have questions.     lidocaine 5 % Patch   Commonly known as:  LIDODERM   Stopped by:  Anastasiia Hummel MD           Prenatal Vitamins 28-0.8 MG Tabs   Stopped by:  Anastasiia Hummel MD           valACYclovir 500 MG tablet   Commonly known as:  VALTREX   Stopped by:  Shaheed" MD Anastasiia                    Primary Care Provider Office Phone # Fax #    Anastasiia Hummel [phone]       [facility] [address] [facility] [address]  [facility] [address]        Thank you!     Thank you for choosing [facility]  for your care. Our goal is always to provide you with excellent care. Hearing back from our patients is one way we can continue to improve our services. Please take a few minutes to complete the written survey that you may receive in the mail after your visit with us. Thank you!             Your Updated Medication List - Protect others around you: Learn how to safely use, store and throw away your medicines at www.disposemymeds.org.          This list is accurate as of: 3/1/17 12:11 PM.  Always use your most recent med list.                   Brand Name Dispense Instructions for use    ZOLOFT 100 MG tablet   Generic drug:  sertraline     30 tablet    Take 2 tabs po qday

## 2017-03-02 ASSESSMENT — PATIENT HEALTH QUESTIONNAIRE - PHQ9: SUM OF ALL RESPONSES TO PHQ QUESTIONS 1-9: 8

## 2017-03-02 ASSESSMENT — ANXIETY QUESTIONNAIRES: GAD7 TOTAL SCORE: 7

## 2017-03-13 ENCOUNTER — OFFICE VISIT (OUTPATIENT)
Dept: OBGYN | Facility: CLINIC | Age: 37
End: 2017-03-13
Payer: COMMERCIAL

## 2017-03-13 VITALS
WEIGHT: 136 LBS | BODY MASS INDEX: 23.22 KG/M2 | SYSTOLIC BLOOD PRESSURE: 120 MMHG | HEIGHT: 64 IN | DIASTOLIC BLOOD PRESSURE: 62 MMHG

## 2017-03-13 DIAGNOSIS — Z97.5 PRESENCE OF INTRAUTERINE CONTRACEPTIVE DEVICE: ICD-10-CM

## 2017-03-13 DIAGNOSIS — Z30.430 ENCOUNTER FOR IUD INSERTION: Primary | ICD-10-CM

## 2017-03-13 PROCEDURE — 58300 INSERT INTRAUTERINE DEVICE: CPT | Performed by: OBSTETRICS & GYNECOLOGY

## 2017-03-13 NOTE — MR AVS SNAPSHOT
"              After Visit Summary   3/13/2017    Rosaline Jimenez    MRN: [id number]           Patient Information     Date Of Birth          1980        Visit Information        Provider Department      3/13/2017 8:40 AM Anastasiia Hummel MD [facility]        Today's Diagnoses     Encounter for IUD insertion    -  1    Presence of intrauterine contraceptive device           Follow-ups after your visit        Who to contact     If you have questions or need follow up information about today's clinic visit or your schedule please contact [facility] directly at [phone].  Normal or non-critical lab and imaging results will be communicated to you by [email or web address], letter or phone within 4 business days after the clinic has received the results. If you do not hear from us within 7 days, please contact the clinic through [email or web address] or phone. If you have a critical or abnormal lab result, we will notify you by phone as soon as possible.  Submit refill requests through [employer] or call your pharmacy and they will forward the refill request to us. Please allow 3 business days for your refill to be completed.          Additional Information About Your Visit        MyChart Information     [employer] lets you send messages to your doctor, view your test results, renew your prescriptions, schedule appointments and more. To sign up, go to [email or web address] . Click on \"Log in\" on the left side of the screen, which will take you to the Welcome page. Then click on \"Sign up Now\" on the right side of the page.     You will be asked to enter the access code listed below, as well as some personal information. Please follow the directions to create your username and password.     Your access code is: [email or web address]  Expires: 2017  1:11 PM     Your access code will  in 90 days. If you need help or a new code, please call your [place] clinic or [phone].        Care EveryWhere ID     " "This is your Care EveryWhere ID. This could be used by other organizations to access your [place] medical records  [id number]        Your Vitals Were     Height Last Period Breastfeeding? BMI (Body Mass Index)          5' 4\" (1.626 m) 03/06/2017 (Exact Date) Yes 23.34 kg/m2         Blood Pressure from Last 3 Encounters:   03/13/17 120/62   03/01/17 102/56   01/14/17 128/80    Weight from Last 3 Encounters:   03/13/17 136 lb (61.7 kg)   03/01/17 138 lb 9.6 oz (62.9 kg)   01/13/17 163 lb (73.9 kg)              We Performed the Following     HC LEVONORGESTREL IU 52MG 5 YR     IUD INSERTION          Today's Medication Changes          These changes are accurate as of: 3/13/17  9:36 AM.  If you have any questions, ask your nurse or doctor.               Start taking these medicines.        Dose/Directions    levonorgestrel 20 MCG/24HR IUD   Commonly known as:  MIRENA (52 MG)   Used for:  Encounter for IUD insertion   Started by:  Anastasiia Hummel MD        Dose:  1 each   1 each (20 mcg) by Intrauterine route once for 1 dose LOT: [id number]  EXP: 09/2019   Quantity:  1 each   Refills:  0            Where to get your medicines      Some of these will need a paper prescription and others can be bought over the counter.  Ask your nurse if you have questions.     You don't need a prescription for these medications     levonorgestrel 20 MCG/24HR IUD                Primary Care Provider Office Phone # Fax #    Anastasiia Hummel [phone]       [facility] [address] [address]        Thank you!     Thank you for choosing [facility]  for your care. Our goal is always to provide you with excellent care. Hearing back from our patients is one way we can continue to improve our services. Please take a few minutes to complete the written survey that you may receive in the mail after your visit with us. Thank you!             Your Updated Medication List - Protect " others around you: Learn how to safely use, store and throw away your medicines at www.disposemymeds.org.          This list is accurate as of: 3/13/17  9:36 AM.  Always use your most recent med list.                   Brand Name Dispense Instructions for use    levonorgestrel 20 MCG/24HR IUD    MIRENA (52 MG)    1 each    1 each (20 mcg) by Intrauterine route once for 1 dose LOT: [id number]  EXP: 09/2019       ZOLOFT 100 MG tablet   Generic drug:  sertraline     30 tablet    Take 2 tabs po qday

## 2017-03-13 NOTE — PROGRESS NOTES
INDICATIONS:                                                      Is a pregnancy test required: No.  Was a consent obtained?  Yes    Rosaline Jimenez is a 36 year old female,   who presents for insertion of an IUD. The risks, benefits and alternatives of IUD insertion were discussed in detail previously. She also has reviewed the product brochure.  She has elected to go ahead with the insertion  today and her questions were answered. Consent was signed. Her LMP began on 3/6/17 and was normal in duration and amount of flow. A pregnancy test was performed today:  No    Today's PHQ-2 Score: No flowsheet data found.    PROCEDURE:                                                      The pelvic exam revealed normal external genitalia. On bimanual exam the uterus was Anteverted and normal in size with no tenderness present. A speculum was inserted into the vagina and the cervix was visualized. The cervix was prepped with Betadine . The anterior lip of the cervix was grasped with a single toothed tenaculum. The uterus sounded to 7 cm. A Mirena IUD was then inserted without difficulty. The string was cut to 3 cm.  The patient experienced a mild amount of cramping.    POST PROCEDURE:                                                      She  tolerated the procedure well. There were no complications. Patient was discharged in stable condition.     Call if severe cramping, fever, abnormal bleeding, abnormal discharge or pelvic pain develop.  Patient was counseled about the chance of irregular bleeding.    Anastasiia Hummel MD

## 2017-03-28 ENCOUNTER — TELEPHONE (OUTPATIENT)
Dept: OBGYN | Facility: CLINIC | Age: 37
End: 2017-03-28

## 2018-02-14 ENCOUNTER — OFFICE VISIT (OUTPATIENT)
Dept: OBGYN | Facility: CLINIC | Age: 38
End: 2018-02-14
Payer: COMMERCIAL

## 2018-02-14 VITALS
SYSTOLIC BLOOD PRESSURE: 100 MMHG | BODY MASS INDEX: 23.22 KG/M2 | WEIGHT: 136 LBS | HEIGHT: 64 IN | DIASTOLIC BLOOD PRESSURE: 66 MMHG

## 2018-02-14 DIAGNOSIS — Z80.3 FAMILY HISTORY OF BREAST CANCER: ICD-10-CM

## 2018-02-14 DIAGNOSIS — Z13.29 SCREENING FOR THYROID DISORDER: ICD-10-CM

## 2018-02-14 DIAGNOSIS — Z13.6 SCREENING FOR CARDIOVASCULAR CONDITION: ICD-10-CM

## 2018-02-14 DIAGNOSIS — F41.9 ANXIETY: ICD-10-CM

## 2018-02-14 DIAGNOSIS — Z01.419 ENCOUNTER FOR GYNECOLOGICAL EXAMINATION WITHOUT ABNORMAL FINDING: Primary | ICD-10-CM

## 2018-02-14 DIAGNOSIS — Z13.228 SCREENING FOR METABOLIC DISORDER: ICD-10-CM

## 2018-02-14 DIAGNOSIS — Z97.5 PRESENCE OF INTRAUTERINE CONTRACEPTIVE DEVICE: ICD-10-CM

## 2018-02-14 DIAGNOSIS — B37.31 YEAST VAGINITIS: ICD-10-CM

## 2018-02-14 LAB
GRAM STN SPEC: ABNORMAL
SPECIMEN SOURCE: ABNORMAL

## 2018-02-14 PROCEDURE — 87624 HPV HI-RISK TYP POOLED RSLT: CPT | Performed by: OBSTETRICS & GYNECOLOGY

## 2018-02-14 PROCEDURE — 80061 LIPID PANEL: CPT | Performed by: OBSTETRICS & GYNECOLOGY

## 2018-02-14 PROCEDURE — 87205 SMEAR GRAM STAIN: CPT | Performed by: OBSTETRICS & GYNECOLOGY

## 2018-02-14 PROCEDURE — G0145 SCR C/V CYTO,THINLAYER,RESCR: HCPCS | Performed by: OBSTETRICS & GYNECOLOGY

## 2018-02-14 PROCEDURE — 36415 COLL VENOUS BLD VENIPUNCTURE: CPT | Performed by: OBSTETRICS & GYNECOLOGY

## 2018-02-14 PROCEDURE — 80053 COMPREHEN METABOLIC PANEL: CPT | Performed by: OBSTETRICS & GYNECOLOGY

## 2018-02-14 PROCEDURE — 99213 OFFICE O/P EST LOW 20 MIN: CPT | Mod: 25 | Performed by: OBSTETRICS & GYNECOLOGY

## 2018-02-14 PROCEDURE — 99395 PREV VISIT EST AGE 18-39: CPT | Performed by: OBSTETRICS & GYNECOLOGY

## 2018-02-14 PROCEDURE — 84443 ASSAY THYROID STIM HORMONE: CPT | Performed by: OBSTETRICS & GYNECOLOGY

## 2018-02-14 RX ORDER — CLOBETASOL PROPIONATE 0.5 MG/G
OINTMENT TOPICAL
Qty: 45 G | Refills: 1 | Status: SHIPPED | OUTPATIENT
Start: 2018-02-14 | End: 2019-02-27

## 2018-02-14 RX ORDER — VALACYCLOVIR HYDROCHLORIDE 500 MG/1
TABLET, FILM COATED ORAL
Refills: 3 | COMMUNITY
Start: 2018-01-03 | End: 2021-03-03

## 2018-02-14 ASSESSMENT — PATIENT HEALTH QUESTIONNAIRE - PHQ9: 5. POOR APPETITE OR OVEREATING: SEVERAL DAYS

## 2018-02-14 ASSESSMENT — ANXIETY QUESTIONNAIRES
6. BECOMING EASILY ANNOYED OR IRRITABLE: SEVERAL DAYS
5. BEING SO RESTLESS THAT IT IS HARD TO SIT STILL: SEVERAL DAYS
2. NOT BEING ABLE TO STOP OR CONTROL WORRYING: NOT AT ALL
IF YOU CHECKED OFF ANY PROBLEMS ON THIS QUESTIONNAIRE, HOW DIFFICULT HAVE THESE PROBLEMS MADE IT FOR YOU TO DO YOUR WORK, TAKE CARE OF THINGS AT HOME, OR GET ALONG WITH OTHER PEOPLE: SOMEWHAT DIFFICULT
3. WORRYING TOO MUCH ABOUT DIFFERENT THINGS: SEVERAL DAYS
7. FEELING AFRAID AS IF SOMETHING AWFUL MIGHT HAPPEN: NOT AT ALL
GAD7 TOTAL SCORE: 6
1. FEELING NERVOUS, ANXIOUS, OR ON EDGE: MORE THAN HALF THE DAYS

## 2018-02-14 NOTE — LETTER
February 25, 2018    Rosaline Jimenez  [address]  [facility] [address]    Dear Rosaline,  We are happy to inform you that your PAP smear result from 2/14/18 is normal.  We are now able to do a follow up test on PAP smears. The DNA test is for HPV (Human Papilloma Virus). Cervical cancer is closely linked with certain types of HPV. Your result showed no evidence of high risk HPV.  Therefore we recommend you return in 3 years for your next pap smear.  You will still need to return to the clinic every year for an annual exam and other preventive tests.  Please contact the clinic at [phone] with any questions.  Sincerely,    Anastasiia Hummel MD/asim

## 2018-02-14 NOTE — MR AVS SNAPSHOT
"              After Visit Summary   2/14/2018    Rosaline Jimenez    MRN: [id number]           Patient Information     Date Of Birth          1980        Visit Information        Provider Department      2/14/2018 2:10 PM Anastasiia Hummel MD [facility] [place]        Today's Diagnoses     Encounter for gynecological examination without abnormal finding    -  1    Presence of intrauterine contraceptive device        Anxiety        Yeast vaginitis        Family history of breast cancer        Screening for cardiovascular condition        Screening for metabolic disorder        Screening for thyroid disorder           Follow-ups after your visit        Who to contact     If you have questions or need follow up information about today's clinic visit or your schedule please contact [facility] [place] directly at [phone].  Normal or non-critical lab and imaging results will be communicated to you by MyChart, letter or phone within 4 business days after the clinic has received the results. If you do not hear from us within 7 days, please contact the clinic through [employer] or phone. If you have a critical or abnormal lab result, we will notify you by phone as soon as possible.  Submit refill requests through [employer] or call your pharmacy and they will forward the refill request to us. Please allow 3 business days for your refill to be completed.          Additional Information About Your Visit        MyChart Information     [employer] lets you send messages to your doctor, view your test results, renew your prescriptions, schedule appointments and more. To sign up, go to [email or web address] . Click on \"Log in\" on the left side of the screen, which will take you to the Welcome page. Then click on \"Sign up Now\" on the right side of the page.     You will be asked to enter the access code listed below, as well as some personal information. Please follow the directions to create your username " "and password.     Your access code is: [email or web address]  Expires: 2018  1:09 PM     Your access code will  in 90 days. If you need help or a new code, please call your [facility] clinic or [phone].        Care EveryWhere ID     This is your Care EveryWhere ID. This could be used by other organizations to access your [facility] medical records  [id number]        Your Vitals Were     Height BMI (Body Mass Index)                5' 4\" (1.626 m) 23.34 kg/m2           Blood Pressure from Last 3 Encounters:   18 100/66   17 120/62   17 102/56    Weight from Last 3 Encounters:   18 136 lb (61.7 kg)   17 136 lb (61.7 kg)   17 138 lb 9.6 oz (62.9 kg)              We Performed the Following     Comprehensive metabolic panel     Gram stain     HPV High Risk Types DNA Cervical     Lipid panel reflex to direct LDL Fasting     Pap imaged thin layer screen with HPV - recommended age 30 - 65     TSH with Free T4 Reflex          Today's Medication Changes          These changes are accurate as of 18 11:59 PM.  If you have any questions, ask your nurse or doctor.               Start taking these medicines.        Dose/Directions    clobetasol 0.05 % ointment   Commonly known as:  TEMOVATE   Used for:  Yeast vaginitis   Started by:  Anastasiia Hummel MD        Apply sparingly to affected area twice daily for 14 days.  Do not apply to face.   Quantity:  45 g   Refills:  1       fluconazole 150 MG tablet   Commonly known as:  DIFLUCAN   Used for:  Yeast vaginitis   Started by:  Anastasiia Hummel MD        Take one tab po qod for 3 doses   Quantity:  3 tablet   Refills:  0            Where to get your medicines      These medications were sent to [id number]  [place] - [address] AT [facility] [place] [address]     Phone:  [phone]     clobetasol 0.05 % ointment    fluconazole 150 MG tablet                Primary Care Provider " Office Phone # Fax #    Anastasiia Hummel [phone] [address] [facility] [place]  [facility] [address]        Equal Access to Services     SHONAADEEL TAMEKA : Masood ely hartman jose m Harris, wamohanda luqdora, qaybta kaalmada kaity, ravindra styles laFatumalay kat. So [facility] [phone].    ATENCIÓN: Si habla español, tiene a alvarez disposición servicios gratuitos de asistencia lingüística. Llame al [phone].    We comply with applicable federal civil rights laws and [place] laws. We do not discriminate on the basis of race, color, national origin, age, disability, sex, sexual orientation, or gender identity.            Thank you!     Thank you for choosing [facility] [place]  for your care. Our goal is always to provide you with excellent care. Hearing back from our patients is one way we can continue to improve our services. Please take a few minutes to complete the written survey that you may receive in the mail after your visit with us. Thank you!             Your Updated Medication List - Protect others around you: Learn how to safely use, store and throw away your medicines at www.disposemymeds.org.          This list is accurate as of 2/14/18 11:59 PM.  Always use your most recent med list.                   Brand Name Dispense Instructions for use Diagnosis    clobetasol 0.05 % ointment    TEMOVATE    45 g    Apply sparingly to affected area twice daily for 14 days.  Do not apply to face.    Yeast vaginitis       fluconazole 150 MG tablet    DIFLUCAN    3 tablet    Take one tab po qod for 3 doses    Yeast vaginitis       levonorgestrel 20 MCG/24HR IUD    MIRENA (52 MG)    1 each    1 each (20 mcg) by Intrauterine route once for 1 dose LOT: [id number]  EXP: 09/2019    Encounter for IUD insertion       valACYclovir 500 MG tablet    VALTREX     TK 1 T PO  BID        ZOLOFT 100 MG tablet   Generic drug:  sertraline     30 tablet    Take 2 tabs po qday

## 2018-02-14 NOTE — PROGRESS NOTES
Rosaline is a 37 year old  female who presents for annual exam.     Besides routine health maintenance, she has no other health concerns today .    HPI:  The patient's PCP is Anastasiia Hummel MD.   Patient has had a very stressful year. 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 Had radiation and chemo and had a feeding tube for a while but now is in remission and doing well.  Then her dad got dx'd with Multiple myeloma and passed a month ago    Overall her anxiety is under good control. Hasn't gone back to any klonopin and just realized she was using it as a crutch. Now is doing her sertraline only and seeing her therapist and managing fine for the most part.    Kids are doing well. yessi is 1 and still nursing 2x/day. Done pumping though. Lasted with miloh about 6-9 months but never had good production with him like does with her.    Patient's fhx for cancer is very strong on paternal side. PGM and 2 aunts all with postmenopausal breast cancer and then dad with MM. Everyone on his side of the family has had cancer of some type. Was planning to start mammos early b/c of history but will wait until done breast feeding.    Only other issue is having some vulvar irritation and pain and therefore painful I.C. Can't quite see well but worried it might even be herpes so has been doing 1g valtrex for quite a while already and not really any better. Looked today and almost looks liek a sore. Just in the last couple days has had some itching but didn't before that. Just had pain with I.C but not deep, all superficial. No recent abx, changes in detergents, using only vaseline topically and nothing else.    Has her mirena and no periods at all but also still nursing 2x/day.      GYNECOLOGIC HISTORY:    No LMP recorded.  Her current contraception method is: IUD.  She  reports that she has never smoked. She has never used smokeless tobacco.    Patient is sexually active.  STD testing  offered?  Declined  Last PHQ-9 score on record =   PHQ-9 SCORE 2018   Total Score 3     Last GAD7 score on record =   GINA-7 SCORE 2018   Total Score 6     Alcohol Score = 0    HEALTH MAINTENANCE:  Cholesterol: Never   Last Mammo: N/A, Result: not applicable, Next Mammo: Age 41  Pap: 13 NIL HPV Neg  Colonoscopy:  N/A, Result: not applicable, Next Colonoscopy: Age 51  Dexa:  Never    Health maintenance updated:  yes    HISTORY:  Obstetric History       T2      L2     SAB0   TAB0   Ectopic0   Multiple0   Live Births2       # Outcome Date GA Lbr Nato/2nd Weight Sex Delivery Anes PTL Lv   2 Term 17 37w4d 04:35 / 00:23 7 lb 3 oz (3.26 kg) F Vag-Spont EPI  SEAN      Name: Jossy      Apgar1:  7                Apgar5: 7   1 Term 07/19/15 39w0d  7 lb 3 oz (3.26 kg) M Vag-Spont   SEAN      Name: [facility]          Patient Active Problem List   Diagnosis     Tension headache     Family history of breast cancer     Anxiety     Migraine with aura and without status migrainosus, not intractable     Herpes simplex vulvovaginitis     Presence of intrauterine contraceptive device     Past Surgical History:   Procedure Laterality Date     AS FOLLICLE PUNC,RETRIEVAL OF OOCYTE      for cryopreservation     HC TOOTH EXTRACTION W/FORCEP      wisdom teeth extraction      Social History   Substance Use Topics     Smoking status: Never Smoker     Smokeless tobacco: Never Used     Alcohol use No      Problem (# of Occurrences) Relation (Name,Age of Onset)    Breast Cancer (3) Paternal Grandmother: postmenopausal, Paternal Aunt: postmenopausal, Paternal Aunt: postmenopausal    CANCER (1) Father: multiple myeloma    Thyroid Disease (2) Mother, Father            Current Outpatient Prescriptions   Medication Sig     valACYclovir (VALTREX) 500 MG tablet TK 1 T PO  BID     clobetasol (TEMOVATE) 0.05 % ointment Apply sparingly to affected area twice daily for 14 days.  Do not apply to face.     sertraline (ZOLOFT)  "100 MG tablet Take 2 tabs po qday     levonorgestrel (MIRENA, 52 MG,) 20 MCG/24HR IUD 1 each (20 mcg) by Intrauterine route once for 1 dose LOT: [id number]   EXP: 09/2019     No current facility-administered medications for this visit.      Allergies   Allergen Reactions     Amoxicillin        Past medical, surgical, social and family histories were reviewed and updated in EPIC.    ROS:   12 point review of systems negative other than symptoms noted below.  Genitourinary: Painful [place]  Neurologic: Headaches  Endocrine: Decreased Libido  Psychiatric: Anxiety and Depression    EXAM:  /66  Ht 5' 4\" (1.626 m)  Wt 136 lb (61.7 kg)  BMI 23.34 kg/m2   BMI: Body mass index is 23.34 kg/(m^2).    PHYSICAL EXAM:  Constitutional:  Appearance: Well nourished, well developed, alert, in no acute distress  Neck:  Lymph Nodes:  No lymphadenopathy present    Thyroid:  Gland size normal, nontender, no nodules or masses present  on palpation  Chest:  Respiratory Effort:  Breathing unlabored  Cardiovascular:    Heart: Auscultation:  Regular rate, normal rhythm, no murmurs present  Breasts: Palpation of Breasts and Axillae:  No masses present on palpation, no breast tenderness. and No nodularity, asymmetry or nipple discharge bilaterally.  Gastrointestinal:   Abdominal Examination:  Abdomen nontender to palpation, tone normal without rigidity or guarding, no masses present, umbilicus without lesions   Liver and Spleen:  No hepatomegaly present, liver nontender to palpation    Hernias:  No hernias present  Lymphatic: Lymph Nodes:  No other lymphadenopathy present  Skin:  General Inspection:  No rashes present, no lesions present, no areas of  discoloration    Genitalia and Groin:  No rashes present, no lesions present, no areas of  discoloration, no masses present  Neurologic/Psychiatric:    Mental Status:  Oriented X3     Pelvic Exam:  External Genitalia:     INFERIOR LABIA MAJORA, PERINEUM AND ONTO BUTTOCKS HAS A " MACULOPAPULAR CONFLUENT ERYTHEMATOUS RASH.   Vagina:     Normal vaginal vault without central or paravaginal defects, THICK CLUMPY DISCHARGE BUT NOT HEAVY, no inflammatory lesions present, no masses present  Bladder:     Nontender to palpation  Urethra:   Urethral Body:  Urethra palpation normal, urethra structural support normal   Urethral Meatus:  No erythema or lesions present  Cervix:     Appearance healthy, no lesions present, nontender to palpation, no bleeding present, IUD STRINGS SEEN  Uterus:     Uterus: firm, normal sized and nontender, anteverted in position.   Adnexa:     No adnexal tenderness present, no adnexal masses present  Perineum:     Perineum within normal limits, no evidence of trauma, no rashes or skin lesions present  Anus:     Anus within normal limits, no hemorrhoids present  Inguinal Lymph Nodes:     No lymphadenopathy present  Pubic Hair:     Normal pubic hair distribution for age  Genitalia and Groin:     No rashes present, no lesions present, no areas of discoloration, no masses present      COUNSELING:   Reviewed preventive health counseling, as reflected in patient instructions  Special attention given to:        family history    BMI: Body mass index is 23.34 kg/(m^2).      ASSESSMENT:  37 year old female with satisfactory annual exam.    ICD-10-CM    1. Encounter for gynecological examination without abnormal finding Z01.419 Pap imaged thin layer screen with HPV - recommended age 30 - 65     HPV High Risk Types DNA Cervical   2. Presence of intrauterine contraceptive device Z97.5    3. Anxiety F41.9    4. Vulvar pruritus L29.2 Gram stain     clobetasol (TEMOVATE) 0.05 % ointment   5. Family history of breast cancer Z80.3    6. Screening for cardiovascular condition Z13.6 Lipid panel reflex to direct LDL Fasting   7. Screening for metabolic disorder Z13.228 Comprehensive metabolic panel   8. Screening for thyroid disorder Z13.29 TSH with Free T4 Reflex       PLAN:  Pap done  today    Will plan mammo next year and do annually given her fhx.    Discussed with her that her fhx of cancer on dad's side may warrant genetic counseling and even genetic testing to see if cancer surveillance gene mutation rather than specifically BRCA. Will consider that and can call for referral if/when ready to do so.    Hasn't had fasting labs in years so will just do them today non fasting and see where things are at.    Patient's pain with I.C is likely all related to the skin irritation and infection that looks highly suspicious for yeast. Will send gram stain as most sensitive then wet prep and if yeast, will treat with longer course oral diflucan, and can do topical monistat for vulva in meantime. Will also rx clobetasol so can use the steroid on it for inflammatory suppression. If those things don't help will need to return for possible bx to r/o lichen planus. Spent an additional 10 minutes on top of her annual exam to address this latter issue.      Anastasiia Hummel MD

## 2018-02-15 LAB
ALBUMIN SERPL-MCNC: 3.9 G/DL (ref 3.4–5)
ALP SERPL-CCNC: 57 U/L (ref 40–150)
ALT SERPL W P-5'-P-CCNC: 16 U/L (ref 0–50)
ANION GAP SERPL CALCULATED.3IONS-SCNC: 4 MMOL/L (ref 3–14)
AST SERPL W P-5'-P-CCNC: 13 U/L (ref 0–45)
BILIRUB SERPL-MCNC: 0.2 MG/DL (ref 0.2–1.3)
BUN SERPL-MCNC: 16 MG/DL (ref 7–30)
CALCIUM SERPL-MCNC: 8.4 MG/DL (ref 8.5–10.1)
CHLORIDE SERPL-SCNC: 106 MMOL/L (ref 94–109)
CHOLEST SERPL-MCNC: 150 MG/DL
CO2 SERPL-SCNC: 30 MMOL/L (ref 20–32)
CREAT SERPL-MCNC: 0.91 MG/DL (ref 0.52–1.04)
GFR SERPL CREATININE-BSD FRML MDRD: 70 ML/MIN/1.7M2
GLUCOSE SERPL-MCNC: 83 MG/DL (ref 70–99)
HDLC SERPL-MCNC: 62 MG/DL
LDLC SERPL CALC-MCNC: 70 MG/DL
NONHDLC SERPL-MCNC: 88 MG/DL
POTASSIUM SERPL-SCNC: 4 MMOL/L (ref 3.4–5.3)
PROT SERPL-MCNC: 7.1 G/DL (ref 6.8–8.8)
SODIUM SERPL-SCNC: 140 MMOL/L (ref 133–144)
TRIGL SERPL-MCNC: 91 MG/DL
TSH SERPL DL<=0.005 MIU/L-ACNC: 3.78 MU/L (ref 0.4–4)

## 2018-02-15 RX ORDER — FLUCONAZOLE 150 MG/1
TABLET ORAL
Qty: 3 TABLET | Refills: 0 | Status: SHIPPED | OUTPATIENT
Start: 2018-02-15 | End: 2019-02-27

## 2018-02-15 ASSESSMENT — PATIENT HEALTH QUESTIONNAIRE - PHQ9: SUM OF ALL RESPONSES TO PHQ QUESTIONS 1-9: 3

## 2018-02-15 ASSESSMENT — ANXIETY QUESTIONNAIRES: GAD7 TOTAL SCORE: 6

## 2018-02-16 LAB
COPATH REPORT: NORMAL
PAP: NORMAL

## 2018-02-20 LAB
FINAL DIAGNOSIS: NORMAL
HPV HR 12 DNA CVX QL NAA+PROBE: NEGATIVE
HPV16 DNA SPEC QL NAA+PROBE: NEGATIVE
HPV18 DNA SPEC QL NAA+PROBE: NEGATIVE
SPECIMEN DESCRIPTION: NORMAL
SPECIMEN SOURCE CVX/VAG CYTO: NORMAL

## 2018-02-23 PROBLEM — N87.0 CIN I (CERVICAL INTRAEPITHELIAL NEOPLASIA I): Status: RESOLVED | Noted: 2018-02-23 | Resolved: 2018-02-23

## 2018-04-18 ENCOUNTER — TRANSFERRED RECORDS (OUTPATIENT)
Dept: HEALTH INFORMATION MANAGEMENT | Facility: CLINIC | Age: 38
End: 2018-04-18

## 2019-02-25 NOTE — PROGRESS NOTES
Rosaline is a 38 year old  female who presents for annual exam.     Besides routine health maintenance, she has no other health concerns today .    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 Patient is not currently having periods (Reason: IUD).  Her current contraception method is: IUD.  She  reports that  has never smoked. she has never used smokeless tobacco.    Patient is sexually active.  STD testing offered?  Declined  Last PHQ-9 score on record =   PHQ-9 SCORE 2019   PHQ-9 Total Score 3     Last GAD7 score on record =   GINA-7 SCORE 2019   Total Score 4     Alcohol Score = 0    HEALTH MAINTENANCE:  Cholesterol: 18   Total= 150, Triglycerides=91, HDL=62, LDL=70, FBS=83, TSH=3.78   Last Mammo: never, Next Mammo: age 41  Pap: HPV: negative  Lab Results   Component Value Date    PAP NIL 2018      Colonoscopy:  never, Next Colonoscopy: age 51.  Dexa:  never    Health maintenance updated:  yes    HISTORY:  Obstetric History       T2      L2     SAB0   TAB0   Ectopic0   Multiple0   Live Births2       # Outcome Date GA Lbr Nato/2nd Weight Sex Delivery Anes PTL Lv   2 Term 17 37w4d 04:35 / 00:23 3.26 kg (7 lb 3 oz) F Vag-Spont EPI  SEAN      Name: Jossy      Apgar1:  7                Apgar5: 7   1 Term 07/19/15 39w0d  3.26 kg (7 lb 3 oz) M Vag-Spont   SEAN      Name: [facility]          Patient Active Problem List   Diagnosis     Tension headache     Family history of breast cancer     Anxiety     Migraine with aura and without status migrainosus, not intractable     Herpes simplex vulvovaginitis     Presence of intrauterine contraceptive device     Past Surgical History:   Procedure  "Laterality Date     AS FOLLICLE PUNC,RETRIEVAL OF OOCYTE  4/13    for cryopreservation     HC TOOTH EXTRACTION W/FORCEP  1999    wisdom teeth extraction      Social History     Tobacco Use     Smoking status: Never Smoker     Smokeless tobacco: Never Used   Substance Use Topics     Alcohol use: No     Alcohol/week: 0.0 oz      Problem (# of Occurrences) Relation (Name,Age of Onset)    Breast Cancer (3) Paternal Grandmother: postmenopausal, Paternal Aunt: postmenopausal, Paternal Aunt: postmenopausal    Cancer (1) Father: multiple myeloma    Thyroid Disease (2) Mother, Father            Current Outpatient Medications   Medication Sig     gabapentin (NEURONTIN) 100 MG capsule      sertraline (ZOLOFT) 100 MG tablet Take 100 mg by mouth daily Take 2 tabs po qday     valACYclovir (VALTREX) 500 MG tablet TK 1 T PO  BID     levonorgestrel (MIRENA, 52 MG,) 20 MCG/24HR IUD 1 each (20 mcg) by Intrauterine route once for 1 dose LOT: [id number]   EXP: 09/2019     No current facility-administered medications for this visit.      Allergies   Allergen Reactions     Amoxicillin        Past medical, surgical, social and family histories were reviewed and updated in EPIC.    ROS:   12 point review of systems negative other than symptoms noted below.  Psychiatric: Anxiety    EXAM:  /56 (BP Location: Right arm, Patient Position: Sitting, Cuff Size: Adult Regular)   Pulse 72   Ht 1.626 m (5' 4\")   Wt 56.1 kg (123 lb 9.6 oz)   Breastfeeding? No   BMI 21.22 kg/m     BMI: Body mass index is 21.22 kg/m .    PHYSICAL EXAM:  Constitutional:  Appearance: Well nourished, well developed, alert, in no acute distress  Neck:  Lymph Nodes:  No lymphadenopathy present    Thyroid:  Gland size normal, nontender, no nodules or masses present  on palpation  Chest:  Respiratory Effort:  Breathing unlabored  Cardiovascular:    Heart: Auscultation:  Regular rate, normal rhythm, no murmurs present  Breasts: Inspection of Breasts:  No lymphadenopathy " present., Palpation of Breasts and Axillae:  No masses present on palpation, no breast tenderness., Axillary Lymph Nodes:  No lymphadenopathy present. and No nodularity, asymmetry or nipple discharge bilaterally.  Gastrointestinal:   Abdominal Examination:  Abdomen nontender to palpation, tone normal without rigidity or guarding, no masses present, umbilicus without lesions   Liver and Spleen:  No hepatomegaly present, liver nontender to palpation    Hernias:  No hernias present  Lymphatic: Lymph Nodes:  No other lymphadenopathy present  Skin:  General Inspection:  No rashes present, no lesions present, no areas of  discoloration    Genitalia and Groin:  No rashes present, no lesions present, no areas of  discoloration, no masses present  Neurologic/Psychiatric:    Mental Status:  Oriented X3     Pelvic Exam:  External Genitalia:     Normal appearance for age, no discharge present, no tenderness present, no inflammatory lesions present, color normal  Vagina:    Normal vaginal vault without central or paravaginal defects, no discharge present, no inflammatory lesions present, no masses present  Bladder:     Nontender to palpation  Urethra:   Urethral Body:  Urethra palpation normal, urethra structural support normal   Urethral Meatus:  No erythema or lesions present  Cervix:     Appearance healthy, no lesions present, nontender to palpation, no bleeding present, string present  Uterus:     Nontender to palpation, no masses present, position anteflexed, mobility: normal  Adnexa:     No adnexal tenderness present, no adnexal masses present  Perineum:     Perineum within normal limits, no evidence of trauma, no rashes or skin lesions present  Anus:     Anus within normal limits, no hemorrhoids present  Inguinal Lymph Nodes:     No lymphadenopathy present  Pubic Hair:     Normal pubic hair distribution for age  Genitalia and Groin:     No rashes present, no lesions present, no areas of discoloration, no masses  present      COUNSELING:   Reviewed preventive health counseling, as reflected in patient instructions       family history of breast cancer    BMI: Body mass index is 21.22 kg/m .      ASSESSMENT:  38 year old female with satisfactory annual exam.    ICD-10-CM    1. Encounter for gynecological examination without abnormal finding Z01.419        PLAN:  Mammogram order placed    Not due for pap this years      Anastasiia Hummel MD

## 2019-02-27 ENCOUNTER — OFFICE VISIT (OUTPATIENT)
Dept: OBGYN | Facility: CLINIC | Age: 39
End: 2019-02-27
Payer: COMMERCIAL

## 2019-02-27 VITALS
HEART RATE: 72 BPM | DIASTOLIC BLOOD PRESSURE: 56 MMHG | SYSTOLIC BLOOD PRESSURE: 114 MMHG | WEIGHT: 123.6 LBS | BODY MASS INDEX: 21.1 KG/M2 | HEIGHT: 64 IN

## 2019-02-27 DIAGNOSIS — Z80.3 FAMILY HISTORY OF BREAST CANCER: ICD-10-CM

## 2019-02-27 DIAGNOSIS — Z01.419 ENCOUNTER FOR GYNECOLOGICAL EXAMINATION WITHOUT ABNORMAL FINDING: Primary | ICD-10-CM

## 2019-02-27 PROCEDURE — 99395 PREV VISIT EST AGE 18-39: CPT | Performed by: OBSTETRICS & GYNECOLOGY

## 2019-02-27 RX ORDER — GABAPENTIN 100 MG/1
CAPSULE ORAL
Refills: 3 | COMMUNITY
Start: 2019-02-26

## 2019-02-27 ASSESSMENT — MIFFLIN-ST. JEOR: SCORE: 1230.65

## 2019-02-27 ASSESSMENT — ANXIETY QUESTIONNAIRES
2. NOT BEING ABLE TO STOP OR CONTROL WORRYING: NOT AT ALL
IF YOU CHECKED OFF ANY PROBLEMS ON THIS QUESTIONNAIRE, HOW DIFFICULT HAVE THESE PROBLEMS MADE IT FOR YOU TO DO YOUR WORK, TAKE CARE OF THINGS AT HOME, OR GET ALONG WITH OTHER PEOPLE: SOMEWHAT DIFFICULT
7. FEELING AFRAID AS IF SOMETHING AWFUL MIGHT HAPPEN: NOT AT ALL
5. BEING SO RESTLESS THAT IT IS HARD TO SIT STILL: NOT AT ALL
3. WORRYING TOO MUCH ABOUT DIFFERENT THINGS: SEVERAL DAYS
6. BECOMING EASILY ANNOYED OR IRRITABLE: SEVERAL DAYS
1. FEELING NERVOUS, ANXIOUS, OR ON EDGE: SEVERAL DAYS
GAD7 TOTAL SCORE: 4

## 2019-02-27 ASSESSMENT — PATIENT HEALTH QUESTIONNAIRE - PHQ9
5. POOR APPETITE OR OVEREATING: SEVERAL DAYS
SUM OF ALL RESPONSES TO PHQ QUESTIONS 1-9: 3

## 2019-02-28 ASSESSMENT — ANXIETY QUESTIONNAIRES: GAD7 TOTAL SCORE: 4

## 2020-03-10 ENCOUNTER — HEALTH MAINTENANCE LETTER (OUTPATIENT)
Age: 40
End: 2020-03-10

## 2020-10-02 ENCOUNTER — VIRTUAL VISIT (OUTPATIENT)
Dept: FAMILY MEDICINE | Facility: OTHER | Age: 40
End: 2020-10-02
Payer: COMMERCIAL

## 2020-10-02 PROCEDURE — 99421 OL DIG E/M SVC 5-10 MIN: CPT | Performed by: NURSE PRACTITIONER

## 2020-10-02 NOTE — PROGRESS NOTES
"Date: 10/02/2020 14:22:13  Clinician: Mar Beckett  Clinician NPI: [id number]  Patient: Rosaline Jimenez  Patient : 1980  Patient Address: [address] [place] [address]  Patient Phone: [phone]  Visit Protocol: URI  Patient Summary:  Rosaline is a 39 year old ( : 1980 ) female who initiated a [facility] Visit for COVID-19 (Coronavirus) evaluation and screening. When asked the question \"Please sign me up to receive news, health information and promotions from [facility].\", Rosaline responded \"No\".    When asked when her symptoms started, Rosaline reported that she does not have any symptoms.   She denies taking antibiotic medication in the past month and having recent facial or sinus surgery in the past 60 days.    Pertinent COVID-19 (Coronavirus) information  In the past 14 days, Rosaline has not worked in a congregate living setting.   She does not work or volunteer as healthcare worker or a  and does not work or volunteer in a healthcare facility.   Rosaline also has not lived in a congregate living setting in the past 14 days. She does not live with a healthcare worker.   Rosaline has had a close contact with a laboratory-confirmed COVID-19 patient in the last 14 days. Additional information about contact with COVID-19 (Coronavirus) patient as reported by the patient (free text): I am a physician. The patient was asymptomatic re: COVID but tested positive on 10/1/2020 after I saw her.  She was not wearing a mask and I was in her room several times. I did a pelvic exam on her. I was wearing a surgical mask with face shield but no gown and no N95. Total time seeing patient &gt;15 minutes, but not more than 10 minutes consecutively.   Patient reported they are not living in the same household with a COVID-19 positive patient.  Patient denies being in an enclosed space for greater than 15 minutes with a COVID-19 patient.  Since 2019, Rosaline and has not had upper respiratory infection or " influenza-like illness. Has not been diagnosed with lab-confirmed COVID-19 test   Pertinent medical history  Rosaline does not get yeast infections when she takes antibiotics.   Rosaline does not need a return to work/school note.   Weight: 122 lbs   Rosaline does not smoke or use smokeless tobacco.   She denies pregnancy and denies breastfeeding. She does not menstruate.   Weight: 122 lbs    MEDICATIONS: sertraline oral, ALLERGIES: amoxicillin  Clinician Response:  Dear Rosaline,   Based on your exposure to COVID-19 (coronavirus), we would like to test you for this virus.  1. Please call [phone] to schedule your visit. Explain that you were referred by [facility] to have a COVID-19 test. Be ready to share your [facility] visit ID number.  The following will serve as your written order for this COVID Test, ordered by me, for the indication of suspected COVID [Z20.828]: The test will be ordered in [facility], our electronic health record, after you are scheduled. It will show as ordered and authorized by Rojelio Farrar MD.  Order: COVID-19 (coronavirus) PCR for ASYMPTOMATIC EXPOSURE testing from [facility].  If you know you have had close contact with someone who tested positive, you should be quarantined for 14 days after this exposure. You should stay in quarantine for the14 days even if the covid test is negative, the optimal time to test after exposure is 5-7 days from the exposure  Quarantine means   What should I do?  For safety, it's very important to follow these rules. Do this for 14 days after the date you were last exposed to the virus..  Stay home and away from others. Don't go to school or anywhere else. Generally quarantine means staying home from work but there are some exceptions to this. Please contact your workplace.   No hugging, kissing or shaking hands.  Don't let anyone visit.  Cover your mouth and nose with a mask, tissue or washcloth to avoid spreading germs.  Wash your hands and face often. Use soap and water.  What  are the symptoms of COVID-19?  The most common symptoms are cough, fever and trouble breathing. Less common symptoms include headache, body aches, fatigue (feeling very tired), chills, sore throat, stuffy or runny nose, diarrhea (loose poop), loss of taste or smell, belly pain, and nausea or vomiting (feeling sick to your stomach or throwing up).  After 14 days, if you have still don't have symptoms, you likely don't have this virus.  If you develop symptoms, follow these guidelines.  If you're normally healthy: Please start another [facility] visit to report your symptoms. Go to [email or web address].  If you have a serious health problem (like cancer, heart failure, an organ transplant or kidney disease): Call your specialty clinic. Let them know that you might have COVID-19.  2. When it's time for your COVID test:  Stay at least 6 feet away from others. (If someone will drive you to your test, stay in the backseat, as far away from the  as you can.)  Cover your mouth and nose with a mask, tissue or washcloth.  Go straight to the testing site. Don't make any stops on the way there or back.  Please note  Caregivers in these groups are at risk for severe illness due to COVID-19:  o People 65 years and older  o People who live in a nursing home or long-term care facility  o People with chronic disease (lung, heart, cancer, diabetes, kidney, liver, immunologic)  o People who have a weakened immune system, including those who:  Are in cancer treatment  Take medicine that weakens the immune system, such as corticosteroids  Had a bone marrow or organ transplant  Have an immune deficiency  Have poorly controlled HIV or AIDS  Are obese (body mass index of 40 or higher)  Smoke regularly  Where can I get more information?   [facility] -- About COVID-19: [email or web address]  CDC -- What to Do If You're Sick: www.cdc.gov/coronavirus/2019-ncov/about/steps-when-sick.html  CDC -- Ending Home Isolation:  www.cdc.gov/coronavirus/2019-ncov/hcp/disposition-in-home-patients.html  [facility] -- Caring for Someone: www.cdc.gov/coronavirus/2019-ncov/if-you-are-sick/care-for-someone.html  [facility] -- Interim Guidance for Hospital Discharge to Home: www.[facility].[place]/diseases/coronavirus/hcp/hospdischarge.pdf  [facility] clinical trials (COVID-19 research studies): clinicalaffairs.[facility]-clinical-trials  Below are the COVID-19 hotlines at the [place] [facility] ([facility]). Interpreters are available.  For health questions: Call 544-259-5938 or 1-633.856.7125 (7 a.m. to 7 p.m.)  For questions about schools and childcare: Call 798-967-1610 or 1-710.667.7151 (7 a.m. to 7 p.m.)    Diagnosis: Contact with and (suspected) exposure to other viral communicable diseases  Diagnosis ICD: Z20.828

## 2020-10-05 ENCOUNTER — RESULTS ONLY (OUTPATIENT)
Dept: LAB | Age: 40
End: 2020-10-05

## 2020-10-05 ENCOUNTER — OFFICE VISIT (OUTPATIENT)
Dept: URGENT CARE | Facility: URGENT CARE | Age: 40
End: 2020-10-05
Payer: COMMERCIAL

## 2020-10-05 DIAGNOSIS — Z53.9 ERRONEOUS ENCOUNTER--DISREGARD: Primary | ICD-10-CM

## 2020-10-06 LAB
SARS-COV-2 RNA SPEC QL NAA+PROBE: NOT DETECTED
SPECIMEN SOURCE: NORMAL

## 2020-12-27 ENCOUNTER — HEALTH MAINTENANCE LETTER (OUTPATIENT)
Age: 40
End: 2020-12-27

## 2021-03-02 NOTE — PROGRESS NOTES
Rosaline is a 40 year old  female who presents for annual exam.     Besides routine health maintenance, she has no other health concerns today.    HPI:  The patient's PCP is Anastasiia Hummel MD.      Patient is doing overall really well. Had a hard couple of days after her second covid vaccination and had lymphadenopathy, malaise, chills, etc. Also got a herpes outbreak with it which she hadn't had in a long time. Got valtrex called in for her by a partner but would like refills sent in for her to have on hand in the future.    IUD in place and liking it. Has some random spotting here and there. Sometimes monthly but usually not that often. Can sometimes just be some brown spotting. Occasionally a bit red. Almost never needs more than a liner or just with wiping. Rarely will use one tampon all day and just once. Minimal to no cramping or other significant PMS issues    Anxiety is well controlled. Taking 100mg sertraline 2 at once for a total of 200mg and working great. Really almost never needing or using any benzos after taking klonopin quite regularly for years. Gets that through a different prescriber.    Has strong fhx of breast cancer on her dad's side. His mom and 2 sisters with breast cancer. All postmenopausal. Thinks one aunt was in her 70s and the other and her GM were in their 60s. The one that was oldest was even on tamoxifen prophylactically and got it anyway. Isn't sure of any of their ER/MD status though. Her dad had MM and passed from that. [place] so not sure if genetics or environment. One of her aunts is a doctor and fairly sure she had genetic testing and it was negative. Had discussed earlier mammo. Called insurance and they won't cover it until age 40 and would have to pay oop even if indicated by family history. Feels like at this point she's so close to 41 that will just do a 3D next year and is comfortable with that and her risks.    Normal fasting labs 3 yrs ago. Not fasting  today    oneyda is 5.5 and held him with a summer bday so will do  this . yessi is 4. Both going to . oneyda will actually go to [employer] this summer for the first time.  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 Did radiation and chemo x1 but counts really dropped so didn't recommend further chemo. Now TIP since then and doing well.      GYNECOLOGIC HISTORY:    No LMP recorded. (Menstrual status: IUD).    Her current contraception method is: IUD.  She  reports that she has never smoked. She has never used smokeless tobacco.    Patient is sexually active.  STD testing offered?  Declined     Last PHQ-9 score on record =   PHQ-9 SCORE 3/3/2021   PHQ-9 Total Score 3     Last GAD7 score on record =   GINA-7 SCORE 3/3/2021   Total Score 1     Alcohol Score = 0    HEALTH MAINTENANCE:  Cholesterol:   Recent Labs   Lab Test 18  1513   CHOL 150   HDL 62   LDL 70   TRIG 91     TSH   Date Value Ref Range Status   2018 3.78 0.40 - 4.00 mU/L Final     Last Mammo: Not applicable, Next Mammo: Due at age 41   Pap:   Lab Results   Component Value Date    PAP NIL NEG-HPV 2018     Colonoscopy: N/A, Next Colonoscopy: Due at age 46   Dexa: N/A    Health maintenance updated:  yes    HISTORY:  OB History    Para Term  AB Living   2 2 2 0 0 2   SAB TAB Ectopic Multiple Live Births   0 0 0 0 2      # Outcome Date GA Lbr Nato/2nd Weight Sex Delivery Anes PTL Lv   2 Term 17 37w4d 04:35 / 00:23 3.26 kg (7 lb 3 oz) F Vag-Spont EPI  SEAN      Birth Comments: followed and delivered by Shaheed      Name: Yessi      Apgar1: 7  Apgar5: 7   1 Term 07/19/15 39w0d  3.26 kg (7 lb 3 oz) M Vag-Spont   SEAN      Birth Comments: followed by Shaheed and delivered by Vikash. 1st degree tear. 2 hours from 4 to complete      Name: Oneyda       Patient Active Problem List   Diagnosis     Tension headache     Family history of breast cancer     Anxiety     Migraine with aura and without status  "migrainosus, not intractable     Herpes simplex vulvovaginitis     Presence of intrauterine contraceptive device     Past Surgical History:   Procedure Laterality Date     AS FOLLICLE PUNC,RETRIEVAL OF OOCYTE  4/13    for cryopreservation     HC TOOTH EXTRACTION W/FORCEP  1999    wisdom teeth extraction      Social History     Tobacco Use     Smoking status: Never Smoker     Smokeless tobacco: Never Used   Substance Use Topics     Alcohol use: No     Alcohol/week: 0.0 standard drinks      Problem (# of Occurrences) Relation (Name,Age of Onset)    Breast Cancer (3) Paternal Grandmother: postmenopausal, Paternal Aunt: postmenopausal, Paternal Aunt: postmenopausal    Cancer (1) Father: multiple myeloma    Thyroid Disease (2) Mother, Father            Current Outpatient Medications   Medication Sig     gabapentin (NEURONTIN) 100 MG capsule      sertraline (ZOLOFT) 100 MG tablet Take 100 mg by mouth daily Take 2 tabs po qday     valACYclovir (VALTREX) 500 MG tablet Take one tab po BID for 3 days as needed     levonorgestrel (MIRENA, 52 MG,) 20 MCG/24HR IUD 1 each (20 mcg) by Intrauterine route once for 1 dose LOT: [id number]   EXP: 09/2019     No current facility-administered medications for this visit.      Allergies   Allergen Reactions     Amoxicillin        Past medical, surgical, social and family histories were reviewed and updated in EPIC.    ROS:   12 point review of systems negative other than symptoms noted below or in the HPI.  No urinary frequency or dysuria, bladder or kidney problems    EXAM:  BP 96/52   Ht 1.626 m (5' 4\")   Wt 58.1 kg (128 lb)   BMI 21.97 kg/m     BMI: Body mass index is 21.97 kg/m .    PHYSICAL EXAM:  Constitutional:   Appearance: Well nourished, well developed, alert, in no acute distress  Neck:  Lymph Nodes:  No lymphadenopathy present    Thyroid:  Gland size normal, nontender, no nodules or masses present  on palpation  Chest:  Respiratory Effort:  Breathing " unlabored  Cardiovascular:    Heart: Auscultation:  Regular rate, normal rhythm, no murmurs present  Breasts: Palpation of Breasts and Axillae:  No masses present on palpation, no breast tenderness., Axillary Lymph Nodes:  No lymphadenopathy present. and No nodularity, asymmetry or nipple discharge bilaterally.  Gastrointestinal:   Abdominal Examination:  Abdomen nontender to palpation, tone normal without rigidity or guarding, no masses present, umbilicus without lesions   Liver and Spleen:  No hepatomegaly present, liver nontender to palpation    Hernias:  No hernias present  Lymphatic: Lymph Nodes:  No other lymphadenopathy present  Skin:  General Inspection:  No rashes present, no lesions present, no areas of  discoloration  Neurologic:    Mental Status:  Oriented X3.  Normal strength and tone, sensory exam                grossly normal, mentation intact and speech normal.    Psychiatric:   Mentation appears normal and affect normal/bright.         Pelvic Exam:  External Genitalia:     Normal appearance for age, no discharge present, no tenderness present, no inflammatory lesions present, color normal  Vagina:    Normal vaginal vault without central or paravaginal defects, no discharge present, no inflammatory lesions present, no masses present  Bladder:     Nontender to palpation  Urethra:   Urethral Body:  Urethra palpation normal, urethra structural support normal   Urethral Meatus:  No erythema or lesions present  Cervix:     Appearance healthy, no lesions present, nontender to palpation, no bleeding present, string present  Uterus:     Nontender to palpation, no masses present, position anteflexed, mobility: normal  Adnexa:     No adnexal tenderness present, no adnexal masses present  Perineum:     Perineum within normal limits, no evidence of trauma, no rashes or skin lesions present  Anus:     Anus within normal limits, no hemorrhoids present  Inguinal Lymph Nodes:     No lymphadenopathy present  Pubic  Hair:     Normal pubic hair distribution for age  Genitalia and Groin:     No rashes present, no lesions present, no areas of discoloration, no masses present      COUNSELING:   Reviewed preventive health counseling, as reflected in patient instructions    BMI: Body mass index is 21.97 kg/m .      ASSESSMENT:  40 year old female with satisfactory annual exam.    ICD-10-CM    1. Encounter for gynecological examination without abnormal finding  Z01.419 Pap imaged thin layer screen with HPV - recommended age 30 - 65     HPV High Risk Types DNA Cervical   2. Presence of intrauterine contraceptive device  Z97.5    3. Anxiety  F41.9    4. Herpes simplex infection of genitourinary system  A60.00 valACYclovir (VALTREX) 500 MG tablet   5. Family history of breast cancer  Z80.3    6. Encounter for lipid screening for cardiovascular disease  Z13.220 Lipid panel reflex to direct LDL Fasting    Z13.6    7. Screening for metabolic disorder  Z13.228 Comprehensive metabolic panel   8. Screening for thyroid disorder  Z13.29 TSH with free T4 reflex   9. Encounter for vitamin deficiency screening  Z13.21 Vitamin D Deficiency       PLAN:  Pap and HPV done today. Nil/neg 3 yrs ago but with ladonna's history and remote history of HPV for her though never any dysplasia, will do 3 yr interval this year    Discussed mammo and screening vs diagnostic and her fhx. Patient is comfortable with just waiting and will do a 3D mammo next year at time of annual and go yearly from there given all fhx was postmenopausal    Refill valtrex to use prn for breakouts.    Normal labs 3 yrs ago but essentially fasting today so will do fasting labs along with D and thyroid since last TSH was upper limits of normal 3 yrs ago    Defer anxiety mgmt to her other provider but doing well on sertraline 200mg daily    Anastasiia Hummel MD

## 2021-03-03 ENCOUNTER — OFFICE VISIT (OUTPATIENT)
Dept: OBGYN | Facility: CLINIC | Age: 41
End: 2021-03-03
Payer: COMMERCIAL

## 2021-03-03 VITALS
DIASTOLIC BLOOD PRESSURE: 52 MMHG | BODY MASS INDEX: 21.85 KG/M2 | WEIGHT: 128 LBS | SYSTOLIC BLOOD PRESSURE: 96 MMHG | HEIGHT: 64 IN

## 2021-03-03 DIAGNOSIS — Z13.21 ENCOUNTER FOR VITAMIN DEFICIENCY SCREENING: ICD-10-CM

## 2021-03-03 DIAGNOSIS — Z97.5 PRESENCE OF INTRAUTERINE CONTRACEPTIVE DEVICE: ICD-10-CM

## 2021-03-03 DIAGNOSIS — Z13.228 SCREENING FOR METABOLIC DISORDER: ICD-10-CM

## 2021-03-03 DIAGNOSIS — F41.9 ANXIETY: ICD-10-CM

## 2021-03-03 DIAGNOSIS — Z13.220 ENCOUNTER FOR LIPID SCREENING FOR CARDIOVASCULAR DISEASE: ICD-10-CM

## 2021-03-03 DIAGNOSIS — Z13.6 ENCOUNTER FOR LIPID SCREENING FOR CARDIOVASCULAR DISEASE: ICD-10-CM

## 2021-03-03 DIAGNOSIS — Z80.3 FAMILY HISTORY OF BREAST CANCER: ICD-10-CM

## 2021-03-03 DIAGNOSIS — Z01.419 ENCOUNTER FOR GYNECOLOGICAL EXAMINATION WITHOUT ABNORMAL FINDING: Primary | ICD-10-CM

## 2021-03-03 DIAGNOSIS — A60.00 HERPES SIMPLEX INFECTION OF GENITOURINARY SYSTEM: ICD-10-CM

## 2021-03-03 DIAGNOSIS — Z13.29 SCREENING FOR THYROID DISORDER: ICD-10-CM

## 2021-03-03 PROCEDURE — 80053 COMPREHEN METABOLIC PANEL: CPT | Performed by: OBSTETRICS & GYNECOLOGY

## 2021-03-03 PROCEDURE — 84443 ASSAY THYROID STIM HORMONE: CPT | Performed by: OBSTETRICS & GYNECOLOGY

## 2021-03-03 PROCEDURE — 80061 LIPID PANEL: CPT | Performed by: OBSTETRICS & GYNECOLOGY

## 2021-03-03 PROCEDURE — 99395 PREV VISIT EST AGE 18-39: CPT | Performed by: OBSTETRICS & GYNECOLOGY

## 2021-03-03 PROCEDURE — 36415 COLL VENOUS BLD VENIPUNCTURE: CPT | Performed by: OBSTETRICS & GYNECOLOGY

## 2021-03-03 PROCEDURE — 87624 HPV HI-RISK TYP POOLED RSLT: CPT | Performed by: OBSTETRICS & GYNECOLOGY

## 2021-03-03 PROCEDURE — 82306 VITAMIN D 25 HYDROXY: CPT | Performed by: OBSTETRICS & GYNECOLOGY

## 2021-03-03 PROCEDURE — G0145 SCR C/V CYTO,THINLAYER,RESCR: HCPCS | Performed by: OBSTETRICS & GYNECOLOGY

## 2021-03-03 RX ORDER — VALACYCLOVIR HYDROCHLORIDE 500 MG/1
TABLET, FILM COATED ORAL
Qty: 12 TABLET | Refills: 3 | Status: SHIPPED | OUTPATIENT
Start: 2021-03-03 | End: 2023-09-13

## 2021-03-03 RX ORDER — SERTRALINE HYDROCHLORIDE 100 MG/1
100 TABLET, FILM COATED ORAL DAILY
Qty: 30 TABLET | Status: CANCELLED | OUTPATIENT
Start: 2021-03-03

## 2021-03-03 SDOH — ECONOMIC STABILITY: TRANSPORTATION INSECURITY
IN THE PAST 12 MONTHS, HAS LACK OF TRANSPORTATION KEPT YOU FROM MEETINGS, WORK, OR FROM GETTING THINGS NEEDED FOR DAILY LIVING?: NOT ASKED

## 2021-03-03 SDOH — ECONOMIC STABILITY: TRANSPORTATION INSECURITY
IN THE PAST 12 MONTHS, HAS THE LACK OF TRANSPORTATION KEPT YOU FROM MEDICAL APPOINTMENTS OR FROM GETTING MEDICATIONS?: NOT ASKED

## 2021-03-03 SDOH — ECONOMIC STABILITY: FOOD INSECURITY: WITHIN THE PAST 12 MONTHS, THE FOOD YOU BOUGHT JUST DIDN'T LAST AND YOU DIDN'T HAVE MONEY TO GET MORE.: NOT ASKED

## 2021-03-03 SDOH — ECONOMIC STABILITY: INCOME INSECURITY: HOW HARD IS IT FOR YOU TO PAY FOR THE VERY BASICS LIKE FOOD, HOUSING, MEDICAL CARE, AND HEATING?: NOT ASKED

## 2021-03-03 SDOH — ECONOMIC STABILITY: FOOD INSECURITY: WITHIN THE PAST 12 MONTHS, YOU WORRIED THAT YOUR FOOD WOULD RUN OUT BEFORE YOU GOT MONEY TO BUY MORE.: NOT ASKED

## 2021-03-03 ASSESSMENT — ANXIETY QUESTIONNAIRES
3. WORRYING TOO MUCH ABOUT DIFFERENT THINGS: NOT AT ALL
5. BEING SO RESTLESS THAT IT IS HARD TO SIT STILL: NOT AT ALL
2. NOT BEING ABLE TO STOP OR CONTROL WORRYING: NOT AT ALL
IF YOU CHECKED OFF ANY PROBLEMS ON THIS QUESTIONNAIRE, HOW DIFFICULT HAVE THESE PROBLEMS MADE IT FOR YOU TO DO YOUR WORK, TAKE CARE OF THINGS AT HOME, OR GET ALONG WITH OTHER PEOPLE: NOT DIFFICULT AT ALL
6. BECOMING EASILY ANNOYED OR IRRITABLE: NOT AT ALL
GAD7 TOTAL SCORE: 1
1. FEELING NERVOUS, ANXIOUS, OR ON EDGE: SEVERAL DAYS
7. FEELING AFRAID AS IF SOMETHING AWFUL MIGHT HAPPEN: NOT AT ALL

## 2021-03-03 ASSESSMENT — PATIENT HEALTH QUESTIONNAIRE - PHQ9
SUM OF ALL RESPONSES TO PHQ QUESTIONS 1-9: 3
5. POOR APPETITE OR OVEREATING: NOT AT ALL

## 2021-03-03 ASSESSMENT — MIFFLIN-ST. JEOR: SCORE: 1240.6

## 2021-03-04 LAB
ALBUMIN SERPL-MCNC: 3.8 G/DL (ref 3.4–5)
ALP SERPL-CCNC: 44 U/L (ref 40–150)
ALT SERPL W P-5'-P-CCNC: 20 U/L (ref 0–50)
ANION GAP SERPL CALCULATED.3IONS-SCNC: 7 MMOL/L (ref 3–14)
AST SERPL W P-5'-P-CCNC: 7 U/L (ref 0–45)
BILIRUB SERPL-MCNC: 0.5 MG/DL (ref 0.2–1.3)
BUN SERPL-MCNC: 16 MG/DL (ref 7–30)
CALCIUM SERPL-MCNC: 9 MG/DL (ref 8.5–10.1)
CHLORIDE SERPL-SCNC: 107 MMOL/L (ref 94–109)
CHOLEST SERPL-MCNC: 178 MG/DL
CO2 SERPL-SCNC: 25 MMOL/L (ref 20–32)
CREAT SERPL-MCNC: 0.91 MG/DL (ref 0.52–1.04)
DEPRECATED CALCIDIOL+CALCIFEROL SERPL-MC: 25 UG/L (ref 20–75)
GFR SERPL CREATININE-BSD FRML MDRD: 79 ML/MIN/{1.73_M2}
GLUCOSE SERPL-MCNC: 64 MG/DL (ref 70–99)
HDLC SERPL-MCNC: 74 MG/DL
LDLC SERPL CALC-MCNC: 96 MG/DL
NONHDLC SERPL-MCNC: 104 MG/DL
POTASSIUM SERPL-SCNC: 4 MMOL/L (ref 3.4–5.3)
PROT SERPL-MCNC: 6.9 G/DL (ref 6.8–8.8)
SODIUM SERPL-SCNC: 139 MMOL/L (ref 133–144)
TRIGL SERPL-MCNC: 39 MG/DL
TSH SERPL DL<=0.005 MIU/L-ACNC: 2.97 MU/L (ref 0.4–4)

## 2021-03-04 ASSESSMENT — ANXIETY QUESTIONNAIRES: GAD7 TOTAL SCORE: 1

## 2021-03-08 LAB
COPATH REPORT: NORMAL
PAP: NORMAL

## 2021-10-03 ENCOUNTER — HEALTH MAINTENANCE LETTER (OUTPATIENT)
Age: 41
End: 2021-10-03

## 2021-10-29 ENCOUNTER — TRANSFERRED RECORDS (OUTPATIENT)
Dept: HEALTH INFORMATION MANAGEMENT | Facility: CLINIC | Age: 41
End: 2021-10-29
Payer: COMMERCIAL

## 2021-12-14 ENCOUNTER — TRANSFERRED RECORDS (OUTPATIENT)
Dept: HEALTH INFORMATION MANAGEMENT | Facility: CLINIC | Age: 41
End: 2021-12-14
Payer: COMMERCIAL

## 2022-01-27 ENCOUNTER — LAB REQUISITION (OUTPATIENT)
Dept: LAB | Facility: CLINIC | Age: 42
End: 2022-01-27

## 2022-01-27 LAB — SARS-COV-2 RNA RESP QL NAA+PROBE: NEGATIVE

## 2022-01-27 PROCEDURE — U0003 INFECTIOUS AGENT DETECTION BY NUCLEIC ACID (DNA OR RNA); SEVERE ACUTE RESPIRATORY SYNDROME CORONAVIRUS 2 (SARS-COV-2) (CORONAVIRUS DISEASE [COVID-19]), AMPLIFIED PROBE TECHNIQUE, MAKING USE OF HIGH THROUGHPUT TECHNOLOGIES AS DESCRIBED BY CMS-2020-01-R: HCPCS | Performed by: INTERNAL MEDICINE

## 2022-03-08 ENCOUNTER — TRANSFERRED RECORDS (OUTPATIENT)
Dept: HEALTH INFORMATION MANAGEMENT | Facility: CLINIC | Age: 42
End: 2022-03-08
Payer: COMMERCIAL

## 2022-04-07 ENCOUNTER — TRANSFERRED RECORDS (OUTPATIENT)
Dept: HEALTH INFORMATION MANAGEMENT | Facility: CLINIC | Age: 42
End: 2022-04-07
Payer: COMMERCIAL

## 2022-05-15 ENCOUNTER — HEALTH MAINTENANCE LETTER (OUTPATIENT)
Age: 42
End: 2022-05-15

## 2022-09-11 ENCOUNTER — HEALTH MAINTENANCE LETTER (OUTPATIENT)
Age: 42
End: 2022-09-11

## 2022-11-02 ENCOUNTER — TRANSFERRED RECORDS (OUTPATIENT)
Dept: HEALTH INFORMATION MANAGEMENT | Facility: CLINIC | Age: 42
End: 2022-11-02

## 2022-12-09 ENCOUNTER — TRANSFERRED RECORDS (OUTPATIENT)
Dept: HEALTH INFORMATION MANAGEMENT | Facility: CLINIC | Age: 42
End: 2022-12-09

## 2023-04-06 ENCOUNTER — APPOINTMENT (OUTPATIENT)
Dept: GENERAL RADIOLOGY | Facility: CLINIC | Age: 43
End: 2023-04-06
Attending: EMERGENCY MEDICINE
Payer: COMMERCIAL

## 2023-04-06 ENCOUNTER — HOSPITAL ENCOUNTER (EMERGENCY)
Facility: CLINIC | Age: 43
Discharge: HOME | End: 2023-04-06
Attending: EMERGENCY MEDICINE | Admitting: EMERGENCY MEDICINE
Payer: COMMERCIAL

## 2023-04-06 VITALS
HEIGHT: 64 IN | SYSTOLIC BLOOD PRESSURE: 109 MMHG | RESPIRATION RATE: 16 BRPM | BODY MASS INDEX: 21.34 KG/M2 | TEMPERATURE: 98.2 F | DIASTOLIC BLOOD PRESSURE: 68 MMHG | WEIGHT: 125 LBS | HEART RATE: 68 BPM | OXYGEN SATURATION: 98 %

## 2023-04-06 DIAGNOSIS — S61.422A LACERATION OF LEFT HAND WITH FOREIGN BODY, INITIAL ENCOUNTER: ICD-10-CM

## 2023-04-06 PROCEDURE — 12001 RPR S/N/AX/GEN/TRNK 2.5CM/<: CPT

## 2023-04-06 PROCEDURE — 73130 X-RAY EXAM OF HAND: CPT | Mod: LT

## 2023-04-06 PROCEDURE — 99283 EMERGENCY DEPT VISIT LOW MDM: CPT

## 2023-04-06 RX ORDER — CEPHALEXIN 500 MG/1
500 CAPSULE ORAL 4 TIMES DAILY
Qty: 40 CAPSULE | Refills: 0 | Status: SHIPPED | OUTPATIENT
Start: 2023-04-06 | End: 2023-04-16

## 2023-04-06 ASSESSMENT — ENCOUNTER SYMPTOMS: WOUND: 1

## 2023-04-06 ASSESSMENT — ACTIVITIES OF DAILY LIVING (ADL)
ADLS_ACUITY_SCORE: 35
ADLS_ACUITY_SCORE: 35

## 2023-04-06 NOTE — ED PROVIDER NOTES
"    History     Chief Complaint:  Laceration       The history is provided by the patient.      Rosaline Jimenez is a 42 year old female who presents with a laceration.  Last evening, she was cleaning a glass at home when it broke and punctured her left hand.  She has had minimal pain however the wound continues to bleed despite direct pressure therefore she was compelled to be evaluated.  She denies any numbness tingling or weakness.  Tetanus is up-to-date per review of the [facility].    Independent Historian: None     Review of External Notes: I reviewed the [facility] which reveals that her tetanus booster is up-to-date.      ROS:  Review of Systems   Skin: Positive for wound (puncture).   All other systems reviewed and are negative.    Allergies:  Amoxicillin    Medications:    Fioricet   Clonazepam    Gabapentin   Levonorgestrel   Sertraline   Valacyclovir    Venlafaxine     Past Medical History:    Anxiety   NELDA I (cervical intraepithelial neoplasia I)   Herpes genitalis    Migraine with aura and without status migrainosus, not intractable   Seizures (H)   Depression     Past Surgical History:    [place] teeth extraction     Family History:    Mother - asthma, cancer, depression, eye disorder, lipids, thyroid disease    Father - cancer, thyroid disease   Brother - depression    Social History:  Denies smoking, smokeless tobacco use, alcohol use, or drug use   PCP: Anastasiia Hummel   The patient presents to the ED alone    Physical Exam     Patient Vitals for the past 24 hrs:   BP Temp Temp src Pulse Resp SpO2 Height Weight   04/06/23 1246 109/68 98.2  F (36.8  C) Oral 68 16 98 % 1.626 m (5' 4\") 56.7 kg (125 lb)        Physical Exam  General: Alert, interactive   Head:  Scalp is atraumatic  Eyes:  No scleral icterus  ENT:      Nose:  The external nose is normal  Ears:  External ears are normal     CV:  Regular rate and rhythm    Brisk capillary refill distal to the injury  Resp:  Breath sounds are clear " bilaterally      MS:  Normal strength in all 4 extremities, full range of motion of the left thumb   skin:  1 cm laceration to the thenar eminence of the left thumb, second laceration is 5 mm in nature, wounds were explored and no foreign body could be appreciated however there were foreign bodies noted on radiograph.  There is no obvious tendon involvement.  Neuro: Strength and sensation intact distal to the injury  Psych:  Awake. Alert.  Normal affect.      Appropriate interactions.    Emergency Department Course   Imaging:  XR Hand Left 3 Views  Preliminary Result  IMPRESSION: Gas in the soft tissues adjacent to the base of the first  metacarpal consistent with recent injury. No fracture. There are a  couple of tiny radiopaque foreign bodies along the volar radial aspect  of the base of the first metacarpal.    Report per radiology    Procedures     Laceration Repair      LACERATION:  A simple clean 1 cm laceration. Another simple clean 5 mm laceration.     LOCATION:  Left Hand.    FUNCTION:  Distally sensation, circulation, motor and tendon function are intact.    ANESTHESIA:  Local using 0.5% Bupivacaine total of 4 mLs.    PREPARATION:  Irrigation with Normal Saline.    DEBRIDEMENT:  no debridement.    CLOSURE:  1 cm wound was closed with One Layer.  Skin closed with 3 x 5.0 nylon using interrupted sutures. 5 mm wound was closed with One Layer.  Skin closed with 1 x 5.0 nylon using interrupted sutures..    Emergency Department Course & Assessments:    Interventions:  None     Independent Interpretation (X-rays, CTs, rhythm strip):  N/A    Consultations/Discussion of Management or Tests:  1321 I spoke with Leanne MCNULTY from orthopedics.    1428 I spoke with Leanne MCNULTY from orthopedics.     Social Determinants of Health affecting care:  None      Assessments:  1200 I obtained history and examined the patient as noted above.  1318 I rechecked the patient and updated.  1419 I rechecked the patient and  administered an anaesthetic.  1604 I rechecked the patient and a laceration repair was performed as outlined in the procedure note above. The patient tolerated well and there were no complications. I discussed plan for discharge home.    Disposition:  The patient was discharged to home.     Impression & Plan    Medical Decision Making:  Following presentation history and physical examination were performed, radiograph was undertaken demonstrating foreign bodies as noted above.  Despite adequate anesthesia and copious irrigation I cannot appreciate the foreign bodies, I discussed this with the PA from orthopedics on-call who is recommended primary closure and close follow-up with a hand surgeons as an outpatient I discussed this with Dr. Jimenez and she was in agreement with this plan as well.  I have ordered cephalexin for infection prophylaxis, tetanus is up-to-date.  She will follow-up with orthopedics for suture removal in 7 days time and will return to the emergency department if new symptoms develop.  There is no signs of neurovascular compromise or more concerning illness.    Diagnosis:    ICD-10-CM    1. Laceration of left hand with foreign body, initial encounter  S61.422A            Discharge Medications:  Discharge Medication List as of 4/6/2023  4:27 PM      START taking these medications    Details   cephALEXin (KEFLEX) 500 MG capsule Take 1 capsule (500 mg) by mouth 4 times daily for 10 days, Disp-40 capsule, R-0, E-Prescribe              Scribe Disclosure:  I, Cr Castro, am serving as a scribe at 12:38 PM on 4/6/2023 to document services personally performed by Jayjay Phoenix MD based on my observations and the provider's statements to me.    4/6/2023   Jayjay Phoenix MD Trigger, Brandon Thomas, MD  04/06/23 7135

## 2023-04-18 ENCOUNTER — TRANSFERRED RECORDS (OUTPATIENT)
Dept: HEALTH INFORMATION MANAGEMENT | Facility: CLINIC | Age: 43
End: 2023-04-18
Payer: COMMERCIAL

## 2023-06-03 ENCOUNTER — HEALTH MAINTENANCE LETTER (OUTPATIENT)
Age: 43
End: 2023-06-03

## 2023-08-06 ENCOUNTER — APPOINTMENT (OUTPATIENT)
Dept: GENERAL RADIOLOGY | Facility: CLINIC | Age: 43
End: 2023-08-06
Attending: EMERGENCY MEDICINE
Payer: COMMERCIAL

## 2023-08-06 ENCOUNTER — HOSPITAL ENCOUNTER (EMERGENCY)
Facility: CLINIC | Age: 43
Discharge: HOME | End: 2023-08-06
Attending: EMERGENCY MEDICINE | Admitting: EMERGENCY MEDICINE
Payer: COMMERCIAL

## 2023-08-06 VITALS
RESPIRATION RATE: 16 BRPM | HEART RATE: 66 BPM | TEMPERATURE: 97.2 F | SYSTOLIC BLOOD PRESSURE: 126 MMHG | DIASTOLIC BLOOD PRESSURE: 80 MMHG | OXYGEN SATURATION: 99 %

## 2023-08-06 DIAGNOSIS — R07.9 CHEST PAIN, UNSPECIFIED TYPE: ICD-10-CM

## 2023-08-06 LAB
ANION GAP SERPL CALCULATED.3IONS-SCNC: 10 MMOL/L (ref 7–15)
ATRIAL RATE - MUSE: 65 BPM
BASOPHILS # BLD AUTO: 0 10E3/UL (ref 0–0.2)
BASOPHILS NFR BLD AUTO: 1 %
BUN SERPL-MCNC: 17.1 MG/DL (ref 6–20)
CALCIUM SERPL-MCNC: 9.4 MG/DL (ref 8.6–10)
CHLORIDE SERPL-SCNC: 104 MMOL/L (ref 98–107)
CREAT SERPL-MCNC: 1.05 MG/DL (ref 0.51–0.95)
DEPRECATED HCO3 PLAS-SCNC: 26 MMOL/L (ref 22–29)
DIASTOLIC BLOOD PRESSURE - MUSE: NORMAL MMHG
EOSINOPHIL # BLD AUTO: 0.1 10E3/UL (ref 0–0.7)
EOSINOPHIL NFR BLD AUTO: 2 %
ERYTHROCYTE [DISTWIDTH] IN BLOOD BY AUTOMATED COUNT: 11.9 % (ref 10–15)
GFR SERPL CREATININE-BSD FRML MDRD: 68 ML/MIN/1.73M2
GLUCOSE SERPL-MCNC: 92 MG/DL (ref 70–99)
HCT VFR BLD AUTO: 40.7 % (ref 35–47)
HGB BLD-MCNC: 13.1 G/DL (ref 11.7–15.7)
IMM GRANULOCYTES # BLD: 0 10E3/UL
IMM GRANULOCYTES NFR BLD: 0 %
INTERPRETATION ECG - MUSE: NORMAL
LYMPHOCYTES # BLD AUTO: 1.5 10E3/UL (ref 0.8–5.3)
LYMPHOCYTES NFR BLD AUTO: 33 %
MCH RBC QN AUTO: 28.3 PG (ref 26.5–33)
MCHC RBC AUTO-ENTMCNC: 32.2 G/DL (ref 31.5–36.5)
MCV RBC AUTO: 88 FL (ref 78–100)
MONOCYTES # BLD AUTO: 0.3 10E3/UL (ref 0–1.3)
MONOCYTES NFR BLD AUTO: 6 %
NEUTROPHILS # BLD AUTO: 2.6 10E3/UL (ref 1.6–8.3)
NEUTROPHILS NFR BLD AUTO: 58 %
NRBC # BLD AUTO: 0 10E3/UL
NRBC BLD AUTO-RTO: 0 /100
P AXIS - MUSE: 76 DEGREES
PLATELET # BLD AUTO: 265 10E3/UL (ref 150–450)
POTASSIUM SERPL-SCNC: 4.3 MMOL/L (ref 3.4–5.3)
PR INTERVAL - MUSE: 180 MS
QRS DURATION - MUSE: 70 MS
QT - MUSE: 420 MS
QTC - MUSE: 436 MS
R AXIS - MUSE: 56 DEGREES
RBC # BLD AUTO: 4.63 10E6/UL (ref 3.8–5.2)
SODIUM SERPL-SCNC: 140 MMOL/L (ref 136–145)
SYSTOLIC BLOOD PRESSURE - MUSE: NORMAL MMHG
T AXIS - MUSE: 60 DEGREES
TROPONIN T SERPL HS-MCNC: <6 NG/L
VENTRICULAR RATE- MUSE: 65 BPM
WBC # BLD AUTO: 4.5 10E3/UL (ref 4–11)

## 2023-08-06 PROCEDURE — 71046 X-RAY EXAM CHEST 2 VIEWS: CPT

## 2023-08-06 PROCEDURE — 93005 ELECTROCARDIOGRAM TRACING: CPT

## 2023-08-06 PROCEDURE — 84484 ASSAY OF TROPONIN QUANT: CPT | Performed by: EMERGENCY MEDICINE

## 2023-08-06 PROCEDURE — 99285 EMERGENCY DEPT VISIT HI MDM: CPT | Mod: 25

## 2023-08-06 PROCEDURE — 250N000013 HC RX MED GY IP 250 OP 250 PS 637: Performed by: EMERGENCY MEDICINE

## 2023-08-06 PROCEDURE — 80048 BASIC METABOLIC PNL TOTAL CA: CPT | Performed by: EMERGENCY MEDICINE

## 2023-08-06 PROCEDURE — 36415 COLL VENOUS BLD VENIPUNCTURE: CPT | Performed by: EMERGENCY MEDICINE

## 2023-08-06 PROCEDURE — 85004 AUTOMATED DIFF WBC COUNT: CPT | Performed by: EMERGENCY MEDICINE

## 2023-08-06 RX ORDER — MAGNESIUM HYDROXIDE/ALUMINUM HYDROXICE/SIMETHICONE 120; 1200; 1200 MG/30ML; MG/30ML; MG/30ML
30 SUSPENSION ORAL ONCE
Status: COMPLETED | OUTPATIENT
Start: 2023-08-06 | End: 2023-08-06

## 2023-08-06 RX ADMIN — ALUMINUM HYDROXIDE, MAGNESIUM HYDROXIDE, AND SIMETHICONE 30 ML: 200; 200; 20 SUSPENSION ORAL at 08:30

## 2023-08-06 ASSESSMENT — ACTIVITIES OF DAILY LIVING (ADL): ADLS_ACUITY_SCORE: 35

## 2023-08-06 NOTE — ED PROVIDER NOTES
History     Chief Complaint:  Chest Pain       The history is provided by the patient.      Rosaline Jimenez is a 42 year old female who presents with who presents with chest pain. The patient reports that for the past 9 days she has experienced a sensation deep to her mid-sternal chest that she describes this sensation as a dull esophageal discomfort and irritation. She explains that she first experienced this discomfort after swallowing a dose of sertraline without enough water. She states that the discomfort is exacerbated 20 minutes after eating food. She presents to the ED today after she woke up around 0700 and experienced the sensation before eating anything. She states that she then ate and drank coffee around 0800 which did not exacerbate her discomfort.  She reports a intermittent history of GERD symptoms and notes that she suffered a gastric ulcer when she was 7-8 years old. She states that she does not take omeprazole or similar medications regularly but she did take a dose of omeprazole yesterday.     She denies experiencing pain in her jaw, arms, or legs, lightheadedness, nausea, vomiting, new leg swelling, abdominal pain, melenic stools, bloody stools, vomiting, or abdominal distension. She also denies any exacerbation of her symptoms with exertion, recent travel, any history of blood clots, smoking, drinking alcohol, family history of cardiovascular problems, or abdominal surgeries. She states that yesterday she performed a strenuous workout with weight lifting. She reports that she has a Mirena IUD. She notes that she has plans for travel to the [facility] in 6 days. She presents to the ED alone.    Independent Historian:   None - Patient Only    Review of External Notes:   None     Medications:    Butalbital-acetaminophen-caffeine  Clonazepam  Gabapentin  Levonorgestrel IUD  Sertraline  Valacyclovir  Venlafaxine    Past Medical History:    Anxiety  Cervical intraepithelial neoplasia  I  GINA  Herpes simplex vulvovaginitis  Hyperlipidemia  IUD in place  Migraine with aura and without status migrainosus, not intractable  Seizures  Tension headache    Past Surgical History:    IUD placement  Oocyte cryopreservation, follicle puncture & retrieval   Peninsula teeth extraction    Physical Exam   Patient Vitals for the past 24 hrs:   BP Temp Temp src Pulse Resp SpO2   08/06/23 0810 -- 97.2  F (36.2  C) Temporal -- -- --   08/06/23 0803 126/80 -- -- 66 16 99 %      Physical Exam  SKIN:  Warm, dry.  HEMATOLOGIC/IMMUNOLOGIC/LYMPHATIC:  No pallor.  EYES:  Conjunctivae normal.  CARDIOVASCULAR:  Regular rate and rhythm.  No murmur.  No rub.  RESPIRATORY:  No respiratory distress, breath sounds equal and normal.  GASTROINTESTINAL:  Soft, nontender abdomen with active bowel sounds.  No distention.  No palpable mass.  MUSCULOSKELETAL: Normal body habitus.  NEUROLOGIC:  Alert, conversant.  PSYCHIATRIC:  Normal mood.    Emergency Department Course   ECG  ECG taken at 0805, ECG read at 0846  Normal sinus rhythm.  Possible left atrial enlargement.   Rate 65 bpm. AR interval 180 ms. QRS duration 70 ms. QT/QTc 420/436 ms. P-R-T axes 76 56 60.     Imaging:  Chest XR,  PA & LAT   Final Result   IMPRESSION: Negative chest. Lungs clear. No pleural effusions.         Report per radiology    Laboratory:  Labs Ordered and Resulted from Time of ED Arrival to Time of ED Departure   BASIC METABOLIC PANEL - Abnormal       Result Value    Sodium 140      Potassium 4.3      Chloride 104      Carbon Dioxide (CO2) 26      Anion Gap 10      Urea Nitrogen 17.1      Creatinine 1.05 (*)     Calcium 9.4      Glucose 92      GFR Estimate 68     TROPONIN T, HIGH SENSITIVITY - Normal    Troponin T, High Sensitivity <6     CBC WITH PLATELETS AND DIFFERENTIAL    WBC Count 4.5      RBC Count 4.63      Hemoglobin 13.1      Hematocrit 40.7      MCV 88      MCH 28.3      MCHC 32.2      RDW 11.9      Platelet Count 265      % Neutrophils 58      %  Lymphocytes 33      % Monocytes 6      % Eosinophils 2      % Basophils 1      % Immature Granulocytes 0      NRBCs per 100 WBC 0      Absolute Neutrophils 2.6      Absolute Lymphocytes 1.5      Absolute Monocytes 0.3      Absolute Eosinophils 0.1      Absolute Basophils 0.0      Absolute Immature Granulocytes 0.0      Absolute NRBCs 0.0          Emergency Department Course & Assessments:    Interventions:  Medications   sodium chloride (PF) 0.9% PF flush 3 mL (has no administration in time range)   sodium chloride (PF) 0.9% PF flush 3 mL (has no administration in time range)   alum & mag hydroxide-simethicone (MAALOX) suspension 30 mL (30 mLs Oral $Given 8/6/23 0830)      Assessments:  0800 I obtained history and examined the patient.  0916 I reassessed the patient.    Independent Interpretation (X-rays, CTs, rhythm strip):  Chest x-ray: Normal mediastinum, normal cardiac silhouette, no pneumothorax effusion or edema.  No infiltrate.  No apparent pulmonary mass.    Consultations/Discussion of Management or Tests:  None     Social Determinants of Health affecting care:   None    Disposition:  The patient was discharged to home.     Impression & Plan      Medical Decision Making:  This patient presents with concern of what has become constant chest discomfort that initially was episodic triggered by eating or drinking.  Suggests a esophageal/gastrointestinal etiology.  Of course considered cardiopulmonary etiology.  Cardiac testing was negative which included blood work and imaging.  My suspicion for pulmonary embolism is quite low given the history of postprandial discomfort.  I do not think the patient required a D-dimer nor CT scan of the chest.  Normal mediastinum and with what was initially episodic symptoms I doubt aortic dissection.  Reassuring nonsurgical abdominal examination.  I do not think the patient is experiencing referred pain from an abdominal pathology such as obstruction.  The patient does not  experience any exertional component to her chest discomfort.  Exercises to a high level.  She is low risk for CAD.  Advise gastroenterology follow-up with a provided referral.  Pending follow-up advised return to the emergency department if worsening or if new concerns.    Diagnosis:    ICD-10-CM    1. Chest pain, unspecified type  R07.9            Discharge Medications:  Discharge Medication List as of 8/6/2023  9:26 AM           Scribe Disclosure:  Sandy RODRIGUEZ, am serving as a scribe at 8:30 AM on 8/6/2023 to document services personally performed by Jesus Bianchi MD based on my observations and the provider's statements to me.        Jesus Bianchi MD  08/06/23 7520

## 2023-08-06 NOTE — ED TRIAGE NOTES
Patient in from home.  has had chest discomfort after swallowing her medication wrong approximately 9 days ago. Was intermittent after eating. States has gotten worse since then. Since yesterday the discomfort has been constant. Today woke up with the discomfort. Describes the discomfort as if something is stuck in her esophagus.

## 2023-08-23 ENCOUNTER — TRANSFERRED RECORDS (OUTPATIENT)
Dept: HEALTH INFORMATION MANAGEMENT | Facility: CLINIC | Age: 43
End: 2023-08-23
Payer: COMMERCIAL

## 2023-09-13 ENCOUNTER — OFFICE VISIT (OUTPATIENT)
Dept: OBGYN | Facility: CLINIC | Age: 43
End: 2023-09-13
Payer: COMMERCIAL

## 2023-09-13 VITALS
HEIGHT: 64 IN | WEIGHT: 131.4 LBS | SYSTOLIC BLOOD PRESSURE: 90 MMHG | BODY MASS INDEX: 22.43 KG/M2 | DIASTOLIC BLOOD PRESSURE: 54 MMHG

## 2023-09-13 DIAGNOSIS — Z13.21 ENCOUNTER FOR VITAMIN DEFICIENCY SCREENING: ICD-10-CM

## 2023-09-13 DIAGNOSIS — Z97.5 PRESENCE OF INTRAUTERINE CONTRACEPTIVE DEVICE: ICD-10-CM

## 2023-09-13 DIAGNOSIS — Z01.84 IMMUNITY STATUS TESTING: ICD-10-CM

## 2023-09-13 DIAGNOSIS — R79.89 ELEVATED SERUM CREATININE: ICD-10-CM

## 2023-09-13 DIAGNOSIS — Z13.6 SCREENING FOR CARDIOVASCULAR CONDITION: ICD-10-CM

## 2023-09-13 DIAGNOSIS — E03.8 SUBCLINICAL HYPOTHYROIDISM: ICD-10-CM

## 2023-09-13 DIAGNOSIS — F41.9 ANXIETY: ICD-10-CM

## 2023-09-13 DIAGNOSIS — Z11.59 ENCOUNTER FOR HEPATITIS C SCREENING TEST FOR LOW RISK PATIENT: ICD-10-CM

## 2023-09-13 DIAGNOSIS — A60.00 HERPES SIMPLEX INFECTION OF GENITOURINARY SYSTEM: ICD-10-CM

## 2023-09-13 DIAGNOSIS — Z13.228 SCREENING FOR METABOLIC DISORDER: ICD-10-CM

## 2023-09-13 DIAGNOSIS — Z01.419 ENCOUNTER FOR GYNECOLOGICAL EXAMINATION WITHOUT ABNORMAL FINDING: Primary | ICD-10-CM

## 2023-09-13 DIAGNOSIS — Z13.0 SCREENING FOR DISORDER OF BLOOD AND BLOOD-FORMING ORGANS: ICD-10-CM

## 2023-09-13 DIAGNOSIS — Z13.1 SCREENING FOR DIABETES MELLITUS: ICD-10-CM

## 2023-09-13 DIAGNOSIS — Z13.29 SCREENING FOR THYROID DISORDER: ICD-10-CM

## 2023-09-13 LAB
ALBUMIN SERPL BCG-MCNC: 4.6 G/DL (ref 3.5–5.2)
ALP SERPL-CCNC: 41 U/L (ref 35–104)
ALT SERPL W P-5'-P-CCNC: 8 U/L (ref 0–50)
ANION GAP SERPL CALCULATED.3IONS-SCNC: 9 MMOL/L (ref 7–15)
AST SERPL W P-5'-P-CCNC: 16 U/L (ref 0–45)
BILIRUB SERPL-MCNC: 0.5 MG/DL
BUN SERPL-MCNC: 16.6 MG/DL (ref 6–20)
CALCIUM SERPL-MCNC: 9 MG/DL (ref 8.6–10)
CHLORIDE SERPL-SCNC: 105 MMOL/L (ref 98–107)
CHOLEST SERPL-MCNC: 173 MG/DL
CREAT SERPL-MCNC: 1.09 MG/DL (ref 0.51–0.95)
DEPRECATED CALCIDIOL+CALCIFEROL SERPL-MC: 28 UG/L (ref 20–75)
DEPRECATED HCO3 PLAS-SCNC: 25 MMOL/L (ref 22–29)
EGFRCR SERPLBLD CKD-EPI 2021: 65 ML/MIN/1.73M2
GLUCOSE SERPL-MCNC: 82 MG/DL (ref 70–99)
HBA1C MFR BLD: 5.1 % (ref 0–5.6)
HBV SURFACE AB SERPL IA-ACNC: 691.51 M[IU]/ML
HBV SURFACE AB SERPL IA-ACNC: REACTIVE M[IU]/ML
HCV AB SERPL QL IA: NONREACTIVE
HDLC SERPL-MCNC: 64 MG/DL
LDLC SERPL CALC-MCNC: 100 MG/DL
NONHDLC SERPL-MCNC: 109 MG/DL
POTASSIUM SERPL-SCNC: 4.1 MMOL/L (ref 3.4–5.3)
PROT SERPL-MCNC: 6.9 G/DL (ref 6.4–8.3)
SODIUM SERPL-SCNC: 139 MMOL/L (ref 136–145)
T4 FREE SERPL-MCNC: 1.11 NG/DL (ref 0.9–1.7)
TRIGL SERPL-MCNC: 44 MG/DL
TSH SERPL DL<=0.005 MIU/L-ACNC: 4.38 UIU/ML (ref 0.3–4.2)

## 2023-09-13 PROCEDURE — 83036 HEMOGLOBIN GLYCOSYLATED A1C: CPT | Performed by: OBSTETRICS & GYNECOLOGY

## 2023-09-13 PROCEDURE — 80053 COMPREHEN METABOLIC PANEL: CPT | Performed by: OBSTETRICS & GYNECOLOGY

## 2023-09-13 PROCEDURE — 82306 VITAMIN D 25 HYDROXY: CPT | Performed by: OBSTETRICS & GYNECOLOGY

## 2023-09-13 PROCEDURE — 84443 ASSAY THYROID STIM HORMONE: CPT | Performed by: OBSTETRICS & GYNECOLOGY

## 2023-09-13 PROCEDURE — 86706 HEP B SURFACE ANTIBODY: CPT | Performed by: OBSTETRICS & GYNECOLOGY

## 2023-09-13 PROCEDURE — 86803 HEPATITIS C AB TEST: CPT | Performed by: OBSTETRICS & GYNECOLOGY

## 2023-09-13 PROCEDURE — 99396 PREV VISIT EST AGE 40-64: CPT | Performed by: OBSTETRICS & GYNECOLOGY

## 2023-09-13 PROCEDURE — 84439 ASSAY OF FREE THYROXINE: CPT | Performed by: OBSTETRICS & GYNECOLOGY

## 2023-09-13 PROCEDURE — 36415 COLL VENOUS BLD VENIPUNCTURE: CPT | Performed by: OBSTETRICS & GYNECOLOGY

## 2023-09-13 PROCEDURE — 80061 LIPID PANEL: CPT | Performed by: OBSTETRICS & GYNECOLOGY

## 2023-09-13 RX ORDER — VALACYCLOVIR HYDROCHLORIDE 500 MG/1
TABLET, FILM COATED ORAL
Qty: 90 TABLET | Refills: 3 | Status: SHIPPED | OUTPATIENT
Start: 2023-09-13

## 2023-09-13 ASSESSMENT — ANXIETY QUESTIONNAIRES
2. NOT BEING ABLE TO STOP OR CONTROL WORRYING: NOT AT ALL
GAD7 TOTAL SCORE: 3
3. WORRYING TOO MUCH ABOUT DIFFERENT THINGS: SEVERAL DAYS
1. FEELING NERVOUS, ANXIOUS, OR ON EDGE: SEVERAL DAYS
IF YOU CHECKED OFF ANY PROBLEMS ON THIS QUESTIONNAIRE, HOW DIFFICULT HAVE THESE PROBLEMS MADE IT FOR YOU TO DO YOUR WORK, TAKE CARE OF THINGS AT HOME, OR GET ALONG WITH OTHER PEOPLE: NOT DIFFICULT AT ALL
5. BEING SO RESTLESS THAT IT IS HARD TO SIT STILL: NOT AT ALL
GAD7 TOTAL SCORE: 3
6. BECOMING EASILY ANNOYED OR IRRITABLE: SEVERAL DAYS
7. FEELING AFRAID AS IF SOMETHING AWFUL MIGHT HAPPEN: NOT AT ALL

## 2023-09-13 ASSESSMENT — PATIENT HEALTH QUESTIONNAIRE - PHQ9
5. POOR APPETITE OR OVEREATING: NOT AT ALL
SUM OF ALL RESPONSES TO PHQ QUESTIONS 1-9: 3

## 2023-09-13 NOTE — PROGRESS NOTES
Rosaline is a 42 year old  female who presents for annual exam.     Besides routine health maintenance, she has no other health concerns today .    HPI:  The patient's PCP is Dr. Anastasiia Hummel MD.  Fasting labs     Pt presents for her annual exam.     Pt cut her hand recently noble was in the ER but got stitched up and fine. Also had a onte time episode of swallowing her sertraline wrong and it stayed in her throat and hurt her chest for 10 days so went to the ER for that. At that visit her creat was 1.05 but BUN and GFR were fine. Wondering if maybe was just dehydrated.         IUD in place for 7.5 yrs and really liking it. Has minimal bleeding/spotting episodes. Not monthly and very light. does not use it has primary BC; hus has vasectomy. Spotting is not any worse  or more frequent not than it was 6-7 years ago.      Anxiety is well controlled on 50mg sertraline. Had been as high as 200mg daily but now much lower dose and works well. Has another prescriber for that    Has strong fhx of breast cancer on her dad's side. His mom and 2 sisters with breast cancer. All postmenopausal. Thinks one aunt was in her 70s and the other and her GM were in their 60s. The one that was oldest was even on tamoxifen prophylactically and got it anyway.  now doing annual mammo but at [facility] given her clinic's association with them. 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       GYNECOLOGIC HISTORY:    No LMP recorded.    Her current contraception method is: IUD.  She  reports that she has never smoked. She has never used smokeless tobacco.    Patient is sexually active.  STD testing offered?  Declined  Last PHQ-9 score on record =       2023     8:39 AM   PHQ-9 SCORE   PHQ-9 Total Score 3     Last GAD7 score on record =       2023     8:39 AM   GINA-7 SCORE   Total Score 3      Alcohol Score = 0    HEALTH MAINTENANCE:  Cholesterol:   Recent Labs   Lab Test 21  0952 18  1513   CHOL 178 150   HDL 74 62   LDL 96 70   TRIG 39 91     Last Mammo:  2022 , Result: Normal, Next Mammo:  2023  Pap:   Lab Results   Component Value Date    PAP NIL 2021    PAP NIL 2018     Colonoscopy:  NA, Result: Not applicable, Next Colonoscopy: 45 years.  Dexa:  NA    Health maintenance updated:  Yes    HISTORY:  OB History    Para Term  AB Living   3 3 3 0 0 3   SAB IAB Ectopic Multiple Live Births   0 0 0 0 3      # Outcome Date GA Lbr Nato/2nd Weight Sex Delivery Anes PTL Lv   3 Term 17 37w4d 04:35 / 00:23 3.26 kg (7 lb 3 oz) F Vag-Spont EPI  SEAN      Birth Comments: followed and delivered by Shaheed      Name: Jossy      Apgar1: 7  Apgar5: 7   2 Term 07/19/15 39w4d 04:15 :49 3.27 kg (7 lb 3.3 oz) M Vag-Spont EPI  SEAN      Apgar1: 8  Apgar5: 9   1 Term 07/19/15 39w0d  3.26 kg (7 lb 3 oz) M Vag-Spont   SEAN      Birth Comments: followed by Shaheed and delivered by Vikash. 1st degree tear. 2 hours from 4 to complete      Name: Oneyda       Patient Active Problem List   Diagnosis    Family history of breast cancer    Anxiety    Herpes simplex vulvovaginitis    Presence of intrauterine contraceptive device     Past Surgical History:   Procedure Laterality Date    AS FOLLICLE PUNC,RETRIEVAL OF OOCYTE  2013    for cryopreservation    HC TOOTH EXTRACTION W/FORCEP  1999    wisdom teeth extraction      Social History     Tobacco Use    Smoking status: Never    Smokeless tobacco: Never   Substance Use Topics    Alcohol use: No     Alcohol/week: 0.0 standard drinks of alcohol      Problem (# of Occurrences) Relation (Name,Age of Onset)    Asthma (2) Mother, Maternal Grandmother    Cancer (2) Mother: Kidney, Father: multiple myeloma    Depression (2) Mother: With Anxiety, Brother: With Anxiety    Eye Disorder (2) Mother: Glaucoma, Maternal Grandmother:  "Glaucoma    Lipids (1) Mother: High Cholesterol    Thyroid Disease (2) Mother: Hypo, Father: Hypo    Breast Cancer (5) Mother, Paternal Grandmother: postmenopausal/50+, Paternal Aunt: postmenopausal, Paternal Aunt: postmenopausal, Paternal Aunt: 50+              Current Outpatient Medications   Medication Sig    gabapentin (NEURONTIN) 100 MG capsule     sertraline (ZOLOFT) 50 MG tablet Take 50 mg by mouth every morning    valACYclovir (VALTREX) 500 MG tablet Take one tab po BID for 3 days as needed    levonorgestrel (MIRENA, 52 MG,) 20 MCG/24HR IUD 1 each (20 mcg) by Intrauterine route once for 1 dose LOT: [id number]   EXP: 09/2019     No current facility-administered medications for this visit.     Allergies   Allergen Reactions    Amoxicillin Hives    Amoxicillin        Past medical, surgical, social and family histories were reviewed and updated in EPIC.    EXAM:  BP 90/54   Ht 1.626 m (5' 4\")   Wt 59.6 kg (131 lb 6.4 oz)   Breastfeeding No   BMI 22.55 kg/m     BMI: Body mass index is 22.55 kg/m .    PHYSICAL EXAM:  Constitutional:   Appearance: Well nourished, well developed, alert, in no acute distress  Neck:  Lymph Nodes:  No lymphadenopathy present    Thyroid:  Gland size normal, nontender, no nodules or masses present  on palpation  Chest:  Respiratory Effort:  Breathing unlabored, CTA B  Cardiovascular:    Heart: Auscultation:  Regular rate, normal rhythm, no murmurs present  Breasts: Inspection of Breasts:  No lymphadenopathy present., Palpation of Breasts and Axillae:  No masses present on palpation, no breast tenderness., Axillary Lymph Nodes:  No lymphadenopathy present., and No nodularity, asymmetry or nipple discharge bilaterally. SMALL STABLE BB SIZED MOBILE SMOOTH MASS RIGHT BREAST AROUND 9 OCLOCK NEAR MARGIN WITH AREOLA  Gastrointestinal:   Abdominal Examination:  Abdomen nontender to palpation, tone normal without rigidity or guarding, no masses present, umbilicus without lesions   Liver and " Spleen:  No hepatomegaly present, liver nontender to palpation    Hernias:  No hernias present  Lymphatic: Lymph Nodes:  No other lymphadenopathy present  Skin:  General Inspection:  No rashes present, no lesions present, no areas of  discoloration  Neurologic:    Mental Status:  Oriented X3.  Normal strength and tone, sensory exam                grossly normal, mentation intact and speech normal.    Psychiatric:   Mentation appears normal and affect normal/bright.         Pelvic Exam:  External Genitalia:     Normal appearance for age, no discharge present, no tenderness present, no inflammatory lesions present, color normal  Vagina:    Normal vaginal vault without central or paravaginal defects, no discharge present, no inflammatory lesions present, no masses present, LIGHT MENSTRUAL BLEEDING PRESENT, DARK BROWN AND RED ALSO  Bladder:     Nontender to palpation  Urethra:   Urethral Body:  Urethra palpation normal, urethra structural support normal   Urethral Meatus:  No erythema or lesions present  Cervix:     Appearance healthy, no lesions present, nontender to palpation, no bleeding present, IUD STRING NOT SEEN  Uterus:     Nontender to palpation, no masses present, position anteflexed, mobility: normal  Adnexa:     No adnexal tenderness present, no adnexal masses present  Perineum:     Perineum within normal limits, no evidence of trauma, no rashes or skin lesions present  Anus:     Anus within normal limits, no hemorrhoids present  Inguinal Lymph Nodes:     No lymphadenopathy present  Pubic Hair:     Normal pubic hair distribution for age  Genitalia and Groin:     No rashes present, no lesions present, no areas of discoloration, no masses present    COUNSELING:   Reviewed preventive health counseling, as reflected in patient instructions    BMI: Body mass index is 22.55 kg/m .      ASSESSMENT:  42 year old female with satisfactory annual exam.    ICD-10-CM    1. Encounter for gynecological examination without  abnormal finding  Z01.419       2. Herpes simplex infection of genitourinary system  A60.00 valACYclovir (VALTREX) 500 MG tablet      3. Presence of intrauterine contraceptive device  Z97.5       4. Anxiety  F41.9       5. Elevated serum creatinine  R79.89 Comprehensive metabolic panel     Comprehensive metabolic panel      6. Screening for cardiovascular condition  Z13.6 Lipid panel reflex to direct LDL Fasting     Lipid panel reflex to direct LDL Fasting      7. Screening for metabolic disorder  Z13.228 Comprehensive metabolic panel     Comprehensive metabolic panel      8. Screening for thyroid disorder  Z13.29 TSH with free T4 reflex     TSH with free T4 reflex      9. Encounter for vitamin deficiency screening  Z13.21 Vitamin D Deficiency     Vitamin D Deficiency      10. Screening for disorder of blood and blood-forming organs  Z13.0       11. Screening for diabetes mellitus  Z13.1 Hemoglobin A1c     Hemoglobin A1c      12. Encounter for hepatitis C screening test for low risk patient  Z11.59 Hepatitis C Screen Reflex to HCV RNA Quant and Genotype     Hepatitis C Screen Reflex to HCV RNA Quant and Genotype      13. Immunity status testing  Z01.84 Hepatitis B Surface Antibody     Hepatitis B Surface Antibody          PLAN:  Pap is UTD for 2.5 more years.   Can follow routine ASCCP guidelines     mammo scheduled for 11/23 at [facility].     Fasting labs, TSH, and Creatinine recheck today.    Will address any abnormalities once results are back.    Also due for one or both, Hep C/HIV screening so will do that at the same time.     Will do hep b antibody testing for immunity proof so can removed flag on H.M    Refill Valrex for a daily preventative dosing. Had a period of time where stressed and more frequent outbreaks so would like ability to do dailiy if that happens again but o/w will just use prn as she mostly does.     IUD strings not seen but bleeding patterns are unchanged over the years and light/infrequent  Due  to be swapped this coming spring so will schedule for that  Not her contraception since  has a vas so if bleeding patterns are continuing light and infrequent can swap whenever it becomes problematic enough to do so  However always consideration for U/S to eval positioning if patterns change since strings weren't seen  Very unlikely that anything but retracted strings so would do office attempt at removal or add U/S guidance prior to considering HSC      Anastasiia Hummel MD

## 2023-09-17 NOTE — RESULT ENCOUNTER NOTE
Rosaline,    So cholesterol, A1C, hepatitis C are normal, and now have proven antibody test for Hep B to show you had vaccine.    You are showing subclinical hypothyroidism this year though. So very slightly high on the TSH but the free T4 is completely normal. I definitely don't think you need to start taking thyroid meds for this but we should probably repeat it in 6 months rather than a full year. I'll pend the orders for a repeat and you can just make an appointment in spring sometime.    Your Vitamin D could be better. I usually shoot for a goal of 40-50 and of course now with winter coming this will drop. If you aren't already I would start on daily D3 at 2000 international unit(s)/day. If you're already taking some amount, you could safely double up on whatever that amount is.    Your creatinine is just a bit higher than when you were in the ER. The GFR is still fine but very slightly lower than it was also. Your BUN is normal though so I don't think this is overt dehydration or anything.  I don't remember if I asked you about PPIs. If you are taking nexium or prilosec or anything like that, I would stop those b/c they can cause this too, and not just NSAIDs.  I think it wouldn't be a bad idea to see a nephrologist and just do whatever additional testing they would recommend, just to make sure the trend doesn't continue the wrong direction.  I really like Refugio Douglass, but you could see anyone that you know or have a relationship with already. They shouldn't need a formal referral, but of course the wait can be a bit. So I would make that appointment and then let me now with who, and I can send them a copy of your labs so they have them.    Anastasiia

## 2023-09-18 ENCOUNTER — MYC MEDICAL ADVICE (OUTPATIENT)
Dept: OBGYN | Facility: CLINIC | Age: 43
End: 2023-09-18
Payer: COMMERCIAL

## 2023-09-18 DIAGNOSIS — R79.89 ELEVATED SERUM CREATININE: Primary | ICD-10-CM

## 2023-09-18 NOTE — CONFIDENTIAL NOTE
Routing pt AppointmentCityhart message to provider to advise.    Records faxed to nephrology clinic BUT pt asking about repeating the creatinine lab sometime in the next week as well, please advise.    Jen Painting RN on 9/18/2023 at 1:50 PM

## 2023-09-19 NOTE — TELEPHONE ENCOUNTER
Yes, we can repeat labs and then fax those to him as well.  I'd like her to do a urine test for albumin when she returns, also

## 2023-09-21 ENCOUNTER — LAB (OUTPATIENT)
Dept: LAB | Facility: CLINIC | Age: 43
End: 2023-09-21
Payer: COMMERCIAL

## 2023-09-21 DIAGNOSIS — R79.89 ELEVATED SERUM CREATININE: ICD-10-CM

## 2023-09-21 LAB
ANION GAP SERPL CALCULATED.3IONS-SCNC: 10 MMOL/L (ref 7–15)
BUN SERPL-MCNC: 16.2 MG/DL (ref 6–20)
CALCIUM SERPL-MCNC: 9.2 MG/DL (ref 8.6–10)
CHLORIDE SERPL-SCNC: 104 MMOL/L (ref 98–107)
CREAT SERPL-MCNC: 1.04 MG/DL (ref 0.51–0.95)
CREAT UR-MCNC: 17.5 MG/DL
DEPRECATED HCO3 PLAS-SCNC: 25 MMOL/L (ref 22–29)
EGFRCR SERPLBLD CKD-EPI 2021: 69 ML/MIN/1.73M2
GLUCOSE SERPL-MCNC: 87 MG/DL (ref 70–99)
MICROALBUMIN UR-MCNC: <12 MG/L
MICROALBUMIN/CREAT UR: NORMAL MG/G{CREAT}
POTASSIUM SERPL-SCNC: 4.1 MMOL/L (ref 3.4–5.3)
SODIUM SERPL-SCNC: 139 MMOL/L (ref 136–145)

## 2023-09-21 PROCEDURE — 80048 BASIC METABOLIC PNL TOTAL CA: CPT

## 2023-09-21 PROCEDURE — 36415 COLL VENOUS BLD VENIPUNCTURE: CPT

## 2023-09-21 PROCEDURE — 82043 UR ALBUMIN QUANTITATIVE: CPT

## 2023-09-21 PROCEDURE — 82570 ASSAY OF URINE CREATININE: CPT

## 2023-09-21 NOTE — RESULT ENCOUNTER NOTE
Rosaline,    Not a huge change, but definitely trending better, and even better than a month ago  Your BUN isn't changed much, hydrating or not, so that doesn't seem to be a huge factor, but either way a good sign.  And the microalbumin is totally normal too.    We'll fax to Warren's office    AJ    MAs  Can we send these labs to nephrology Dr. Refugio Kelley

## 2023-10-05 DIAGNOSIS — R79.89 ELEVATED SERUM CREATININE: Primary | ICD-10-CM

## 2023-11-08 ENCOUNTER — TRANSFERRED RECORDS (OUTPATIENT)
Dept: HEALTH INFORMATION MANAGEMENT | Facility: CLINIC | Age: 43
End: 2023-11-08
Payer: COMMERCIAL

## 2023-11-16 ENCOUNTER — TRANSFERRED RECORDS (OUTPATIENT)
Dept: HEALTH INFORMATION MANAGEMENT | Facility: CLINIC | Age: 43
End: 2023-11-16
Payer: COMMERCIAL

## 2024-07-24 ENCOUNTER — TRANSFERRED RECORDS (OUTPATIENT)
Dept: HEALTH INFORMATION MANAGEMENT | Facility: CLINIC | Age: 44
End: 2024-07-24
Payer: COMMERCIAL

## 2024-07-29 ENCOUNTER — TRANSFERRED RECORDS (OUTPATIENT)
Dept: HEALTH INFORMATION MANAGEMENT | Facility: CLINIC | Age: 44
End: 2024-07-29

## 2024-08-28 ENCOUNTER — TRANSFERRED RECORDS (OUTPATIENT)
Dept: HEALTH INFORMATION MANAGEMENT | Facility: CLINIC | Age: 44
End: 2024-08-28
Payer: COMMERCIAL

## 2024-10-16 NOTE — PROGRESS NOTES
Rosaline is a 43 year old  female who presents for annual exam.     Besides routine health maintenance, she has no other health concerns today .    HPI:  The patient's PCP is Dr. Anastasiia Hummel MD.     Pt presents for her annual exam.      Mirena IUD in place for 7.5 yrs (3/2017) and really liking it. Has minimal bleeding/spotting episodes. Not monthly and very light. does not use it as her primary BC bc hus has vasectomy. Spotting is not any worse  or more frequent now than it was 6-7 years ago.    Denied all v.m sx.    Anxiety is well controlled on 50mg sertraline. Had been as high as 200mg daily but now much lower dose and works well. 2 years ago she was taking 75mg but over time weaned down to 50mg which she's happy with.  A different provider manages that.    Strong Fhx of breast ca on her father's side: grandmother and 2 aunts, all were post-menopausal. Not totally sure, but thinks one aunt was in her 70s and grandma was in her 60s. One of them was on tamoxifen prophylactically but got it anyway. Now doing annual mammo but at [facility] given her clinic's association with them. 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    Was found on labs a year ago to have a mildly elevated creatinine and slightly low GFR starting with an ER visit . Had it repeated with me a couple of times and it was as high as 1.09 with GFR of 65 and neg microalbumin. She had no risk factors for this and so referred her to renal last year. Saw them and repeat was very slightly elevated. Recommended renal U/S just to be sure but never did get that done.   Would like to recheck metabolic panel but short on time today so would like to just make lab appointment to do it in near future.    GYNECOLOGIC HISTORY:    No LMP recorded. (Menstrual status: IUD).    Her current contraception method is: IUD.occasional spotting   had vasectomy      She  reports that she has never smoked. She has never used smokeless tobacco.    Patient is sexually active.  STD testing offered?  Declined  Last PHQ-9 score on record =       2023     8:39 AM   PHQ-9 SCORE   PHQ-9 Total Score 3     Last GAD7 score on record =       2023     8:39 AM   GINA-7 SCORE   Total Score 3     Alcohol Score =     HEALTH MAINTENANCE:  Cholesterol:   Recent Labs   Lab Test 23  0902 21  0952   CHOL 173 178   HDL 64 74    96   TRIG 44 39       Last Mammo:  2023 , Result: Normal, Next Mammo: 2024     Pap:   Lab Results   Component Value Date    PAP NIL 2021    PAP NIL 2018     Colonoscopy:  NA, Result: Not applicable, Next Colonoscopy: 45 years.  Dexa:  NA    Health maintenance updated:  yes    HISTORY:  OB History    Para Term  AB Living   2 2 2 0 0 2   SAB IAB Ectopic Multiple Live Births   0 0 0 0 2      # Outcome Date GA Lbr Nato/2nd Weight Sex Type Anes PTL Lv   2 Term 17 37w4d 04:35 / 00:23 3.26 kg (7 lb 3 oz) F Vag-Spont EPI  SEAN      Birth Comments: followed and delivered by Shaheed      Name: Jossy      Apgar1: 7  Apgar5: 7   1 Term 07/19/15 39w4d 04:15 / 01:49 3.27 kg (7 lb 3.3 oz) M Vag-Spont EPI  SEAN      Birth Comments: followed by Shaheed and delivered by Vikash. 1st degree tear. 2 hours from 4 to complete      Name: Oneyda      Apgar1: 8  Apgar5: 9       Patient Active Problem List   Diagnosis    Family history of breast cancer    Anxiety    Herpes simplex vulvovaginitis    Presence of intrauterine contraceptive device     Past Surgical History:   Procedure Laterality Date    AS FOLLICLE PUNC,RETRIEVAL OF OOCYTE  2013    for cryopreservation    HC TOOTH EXTRACTION W/FORCEP  1999    wisdom teeth extraction      Social History     Tobacco Use    Smoking status: Never    Smokeless tobacco: Never   Substance Use Topics    Alcohol use: No     Alcohol/week: 0.0 standard drinks of  "alcohol      Problem (# of Occurrences) Relation (Name,Age of Onset)    Asthma (2) Mother, Maternal Grandmother    Cancer (2) Mother: Kidney, Father: multiple myeloma    Depression (2) Mother: With Anxiety, Brother: With Anxiety    Eye Disorder (2) Mother: Glaucoma, Maternal Grandmother: Glaucoma    Lipids (1) Mother: High Cholesterol    Thyroid Disease (2) Mother: Hypo, Father: Hypo    Breast Cancer (5) Mother, Paternal Grandmother: postmenopausal/50+, Paternal Aunt: postmenopausal, Paternal Aunt: postmenopausal, Paternal Aunt: 50+              Current Outpatient Medications   Medication Sig Dispense Refill    gabapentin (NEURONTIN) 100 MG capsule   3    sertraline (ZOLOFT) 50 MG tablet Take 50 mg by mouth every morning      valACYclovir (VALTREX) 500 MG tablet Take one tab po BID for 3 days as needed 90 tablet 3    levonorgestrel (MIRENA, 52 MG,) 20 MCG/24HR IUD 1 each (20 mcg) by Intrauterine route once for 1 dose LOT: [id number]   EXP: 09/2019 1 each 0     No current facility-administered medications for this visit.     Allergies   Allergen Reactions    Amoxicillin Hives    Amoxicillin        Past medical, surgical, social and family histories were reviewed and updated in EPIC.    EXAM:  BP 90/52   Ht 1.619 m (5' 3.75\")   Wt 58 kg (127 lb 12.8 oz)   Breastfeeding No   BMI 22.11 kg/m     BMI: Body mass index is 22.11 kg/m .    PHYSICAL EXAM:  Constitutional:   Appearance: Well nourished, well developed, alert, in no acute distress  Neck:  Lymph Nodes:  No lymphadenopathy present    Thyroid:  Gland size normal, nontender, no nodules or masses present  on palpation  Chest:  Respiratory Effort:  Breathing unlabored, CTA B  Cardiovascular:    Heart: Auscultation:  Regular rate, normal rhythm, no murmurs present  Breasts: Inspection of Breasts:  No lymphadenopathy present., Palpation of Breasts and Axillae:  No masses present on palpation, no breast tenderness., Axillary Lymph Nodes:  No lymphadenopathy present., and " No nodularity, asymmetry or nipple discharge bilaterally.  Gastrointestinal:   Abdominal Examination:  Abdomen nontender to palpation, tone normal without rigidity or guarding, no masses present, umbilicus without lesions   Liver and Spleen:  No hepatomegaly present, liver nontender to palpation    Hernias:  No hernias present  Lymphatic: Lymph Nodes:  No other lymphadenopathy present  Skin:  General Inspection:  No rashes present, no lesions present, no areas of  discoloration  Neurologic:    Mental Status:  Oriented X3.  Normal strength and tone, sensory exam                grossly normal, mentation intact and speech normal.    Psychiatric:   Mentation appears normal and affect normal/bright.         Pelvic Exam:  External Genitalia:     Normal appearance for age, no discharge present, no tenderness present, no inflammatory lesions present, color normal  Vagina:    Normal vaginal vault without central or paravaginal defects, no discharge present, no inflammatory lesions present, no masses present  Bladder:     Nontender to palpation  Urethra:   Urethral Body:  Urethra palpation normal, urethra structural support normal   Urethral Meatus:  No erythema or lesions present  Cervix:     Appearance healthy, no lesions present, nontender to palpation, no bleeding present, STRINGS NOT SEEN  Uterus:     Nontender to palpation, no masses present, position anteflexed, mobility: normal  Adnexa:     No adnexal tenderness present, no adnexal masses present  Perineum:     Perineum within normal limits, no evidence of trauma, no rashes or skin lesions present  Anus:     Anus within normal limits, no hemorrhoids present  Inguinal Lymph Nodes:     No lymphadenopathy present  Pubic Hair:     Normal pubic hair distribution for age  Genitalia and Groin:     No rashes present, no lesions present, no areas of discoloration, no masses present    COUNSELING:   Reviewed preventive health counseling, as reflected in patient instructions        Regular exercise       Healthy diet/nutrition       Immunizations  Declined: Influenza due to Other Planning on receiving at work.           (Keira)menopause management    BMI: Body mass index is 22.11 kg/m .    ASSESSMENT:  43 year old female with satisfactory annual exam.    ICD-10-CM    1. Encounter for gynecological examination without abnormal finding  Z01.419       2. Herpes simplex infection of genitourinary system  A60.00 valACYclovir (VALTREX) 500 MG tablet      3. Presence of intrauterine contraceptive device  Z97.5       4. Family history of breast cancer  Z80.3       5. Elevated serum creatinine  R79.89 Comprehensive metabolic panel (BMP + Alb, Alk Phos, ALT, AST, Total. Bili, TP)          PLAN:  Pap is UTD for 1-2 more years.   Can follow routine ASCCP guidelines     Mammo will be due next month and doing them at [facility]    Fasting labs all normal 9/23 other than the creatinine.  Can defer ovrall full fasting labs and do them q2-3 yrs or so but pended CMP and she will schedule a lab appointment to do that asap.  If her creatinine is still elevated, or if GFR lower, then definitely would recommend renal U/S and again following up with them for continued monitoring and interventions as indicated    Additional health issues addressed at today's visit include:    Discussed (keira)menopause and typical course of v.m sx, metabolic changes and mood effects.  She is having great menstrual control from her IUD and though it is technically expiring in 6 months or so, it is not her contraception and so can leave it in as long as her bleeding patterns are minimal and favorable.  However, given IUD strings aren't visible, when she does feel she wants to do a removal and a swap, will need to schedule that as a separate visit, not at the time of preventative care, and likely would consider doing it with U/S guidance.    Patient continues to use Valtrex just prn but at times of stress/illness, when starts to have outbreaks  slightly more often, will switch to daily suppression for a period of time.  Rx for daily valtrex sent in for her so has the flexibility to use it prn or daily    Defer her anxiety mgmt/med mgmt to her other provider        Anastasiia Hummel MD

## 2024-10-23 ENCOUNTER — OFFICE VISIT (OUTPATIENT)
Dept: OBGYN | Facility: CLINIC | Age: 44
End: 2024-10-23
Payer: COMMERCIAL

## 2024-10-23 VITALS
HEIGHT: 64 IN | SYSTOLIC BLOOD PRESSURE: 90 MMHG | WEIGHT: 127.8 LBS | DIASTOLIC BLOOD PRESSURE: 52 MMHG | BODY MASS INDEX: 21.82 KG/M2

## 2024-10-23 DIAGNOSIS — Z97.5 PRESENCE OF INTRAUTERINE CONTRACEPTIVE DEVICE: ICD-10-CM

## 2024-10-23 DIAGNOSIS — R79.89 ELEVATED SERUM CREATININE: ICD-10-CM

## 2024-10-23 DIAGNOSIS — A60.00 HERPES SIMPLEX INFECTION OF GENITOURINARY SYSTEM: ICD-10-CM

## 2024-10-23 DIAGNOSIS — Z80.3 FAMILY HISTORY OF BREAST CANCER: ICD-10-CM

## 2024-10-23 DIAGNOSIS — Z01.419 ENCOUNTER FOR GYNECOLOGICAL EXAMINATION WITHOUT ABNORMAL FINDING: Primary | ICD-10-CM

## 2024-10-23 PROCEDURE — 99396 PREV VISIT EST AGE 40-64: CPT | Performed by: OBSTETRICS & GYNECOLOGY

## 2024-10-23 RX ORDER — VALACYCLOVIR HYDROCHLORIDE 500 MG/1
TABLET, FILM COATED ORAL
Qty: 90 TABLET | Refills: 3 | Status: SHIPPED | OUTPATIENT
Start: 2024-10-23

## 2024-11-11 ENCOUNTER — TRANSFERRED RECORDS (OUTPATIENT)
Dept: HEALTH INFORMATION MANAGEMENT | Facility: CLINIC | Age: 44
End: 2024-11-11

## 2024-11-11 ENCOUNTER — LAB (OUTPATIENT)
Dept: LAB | Facility: CLINIC | Age: 44
End: 2024-11-11
Payer: COMMERCIAL

## 2024-11-11 DIAGNOSIS — E03.8 SUBCLINICAL HYPOTHYROIDISM: ICD-10-CM

## 2024-11-11 DIAGNOSIS — R79.89 ELEVATED SERUM CREATININE: ICD-10-CM

## 2024-11-11 PROCEDURE — 84443 ASSAY THYROID STIM HORMONE: CPT

## 2024-11-11 PROCEDURE — 84480 ASSAY TRIIODOTHYRONINE (T3): CPT

## 2024-11-11 PROCEDURE — 80053 COMPREHEN METABOLIC PANEL: CPT

## 2024-11-11 PROCEDURE — 84439 ASSAY OF FREE THYROXINE: CPT

## 2024-11-11 PROCEDURE — 36415 COLL VENOUS BLD VENIPUNCTURE: CPT

## 2024-11-12 LAB
ALBUMIN SERPL BCG-MCNC: 4.7 G/DL (ref 3.5–5.2)
ALP SERPL-CCNC: 53 U/L (ref 40–150)
ALT SERPL W P-5'-P-CCNC: 16 U/L (ref 0–50)
ANION GAP SERPL CALCULATED.3IONS-SCNC: 14 MMOL/L (ref 7–15)
AST SERPL W P-5'-P-CCNC: 16 U/L (ref 0–45)
BILIRUB SERPL-MCNC: 0.3 MG/DL
BUN SERPL-MCNC: 19.7 MG/DL (ref 6–20)
CALCIUM SERPL-MCNC: 9.8 MG/DL (ref 8.8–10.4)
CHLORIDE SERPL-SCNC: 102 MMOL/L (ref 98–107)
CREAT SERPL-MCNC: 1.09 MG/DL (ref 0.51–0.95)
EGFRCR SERPLBLD CKD-EPI 2021: 64 ML/MIN/1.73M2
GLUCOSE SERPL-MCNC: 87 MG/DL (ref 70–99)
HCO3 SERPL-SCNC: 23 MMOL/L (ref 22–29)
POTASSIUM SERPL-SCNC: 4.2 MMOL/L (ref 3.4–5.3)
PROT SERPL-MCNC: 7.4 G/DL (ref 6.4–8.3)
SODIUM SERPL-SCNC: 139 MMOL/L (ref 135–145)
T3 SERPL-MCNC: 81 NG/DL (ref 85–202)
T4 FREE SERPL-MCNC: 1.03 NG/DL (ref 0.9–1.7)
TSH SERPL DL<=0.005 MIU/L-ACNC: 3.71 UIU/ML (ref 0.3–4.2)

## 2025-02-02 ENCOUNTER — OFFICE VISIT (OUTPATIENT)
Dept: URGENT CARE | Facility: URGENT CARE | Age: 45
End: 2025-02-02
Payer: COMMERCIAL

## 2025-02-02 VITALS
OXYGEN SATURATION: 100 % | WEIGHT: 125 LBS | TEMPERATURE: 98.9 F | RESPIRATION RATE: 18 BRPM | SYSTOLIC BLOOD PRESSURE: 102 MMHG | HEART RATE: 86 BPM | DIASTOLIC BLOOD PRESSURE: 65 MMHG | BODY MASS INDEX: 21.62 KG/M2

## 2025-02-02 DIAGNOSIS — R68.89 FLU-LIKE SYMPTOMS: Primary | ICD-10-CM

## 2025-02-02 PROCEDURE — 99203 OFFICE O/P NEW LOW 30 MIN: CPT | Performed by: PHYSICIAN ASSISTANT

## 2025-02-02 NOTE — PROGRESS NOTES
URGENT CARE VISIT:    SUBJECTIVE:   Rosaline Jimenez is a 44 year old female presenting with a chief complaint of chills, stuffy nose, cough - productive, and body aches.  Onset was 2 day(s) ago.   She denies the following symptoms: shortness of breath  Course of illness is same.    Treatment measures tried include Tamiflu with no relief of symptoms.  Predisposing factors include ill contact: daughter with flu and strep.    PMH:   Past Medical History:   Diagnosis Date    Anxiety     chronic klonopin use.weaned off during first preg. mostly controlled on zoloft. has a psych MD    NELDA I (cervical intraepithelial neoplasia I)     9/12-NELDA 1 at 6oclock bx and 12 oclock with HPV effect    Family history of breast cancer     pgm/paunt    Herpes genitalis     IUD (intrauterine device) in place 03/13/2017    Mirena-had prior to first preg    Migraine with aura and without status migrainosus, not intractable 11/23/2016    Seizures (H)     febrile seizure as a child (2nd grade)    Tension headache      Allergies: Amoxicillin and Amoxicillin   Medications:   Current Outpatient Medications   Medication Sig Dispense Refill    sertraline (ZOLOFT) 50 MG tablet Take 50 mg by mouth every morning      valACYclovir (VALTREX) 500 MG tablet Take one tab po BID for 3 days as needed 90 tablet 3    gabapentin (NEURONTIN) 100 MG capsule  (Patient not taking: Reported on 2/2/2025)  3    levonorgestrel (MIRENA, 52 MG,) 20 MCG/24HR IUD 1 each (20 mcg) by Intrauterine route once for 1 dose LOT: [id number]   EXP: 09/2019 1 each 0     Social History:   Social History     Tobacco Use    Smoking status: Never    Smokeless tobacco: Never   Substance Use Topics    Alcohol use: No     Alcohol/week: 0.0 standard drinks of alcohol       ROS:  Review of systems negative except as stated above.    OBJECTIVE:  /65 (BP Location: Right arm, Patient Position: Sitting, Cuff Size: Adult Regular)   Pulse 86   Temp 98.9  F (37.2  C) (Tympanic)   Resp 18    Wt 56.7 kg (125 lb)   SpO2 100%   BMI 21.62 kg/m    GENERAL APPEARANCE: healthy, alert and no distress  EYES: EOMI,  PERRL, conjunctiva clear  HENT: ear canals and TM's normal.  Nose and mouth without ulcers, erythema or lesions  NECK: supple, nontender, no lymphadenopathy  RESP: lungs clear to auscultation - no rales, rhonchi or wheezes  CV: regular rates and rhythm, normal S1 S2, no murmur noted  SKIN: no suspicious lesions or rashes      ASSESSMENT:    ICD-10-CM    1. Flu-like symptoms  R68.89           PLAN:  Patient Instructions   Patient was educated on the natural course of viral illness which typically lasts up to 10 days.  I suspect flu as she had close contact. Finish Tamiflu as rx previously. Conservative measures discussed including increased fluids, nasal saline irrigation (neti pot), warm steamy shower, salt water gargles, expectorants (Mucinex), decongestants (Pseudofed), and analgesics (Tylenol and/or Ibuprofen). See your primary care provider if symptoms worsen or do not improve in 7 days. Seek emergency care if you develop fever over 104 or shortness of breath.  Patient verbalized understanding and is agreeable to plan. The patient was discharged ambulatory and in stable condition.    Mariya Baird PA-C ....................  2/2/2025   1:45 PM

## 2025-02-02 NOTE — PATIENT INSTRUCTIONS
Patient was educated on the natural course of viral illness which typically lasts up to 10 days.  I suspect flu as she had close contact. Finish Tamiflu as rx previously. Conservative measures discussed including increased fluids, nasal saline irrigation (neti pot), warm steamy shower, salt water gargles, expectorants (Mucinex), decongestants (Pseudofed), and analgesics (Tylenol and/or Ibuprofen). See your primary care provider if symptoms worsen or do not improve in 7 days. Seek emergency care if you develop fever over 104 or shortness of breath.

## 2025-02-07 ENCOUNTER — APPOINTMENT (OUTPATIENT)
Dept: GENERAL RADIOLOGY | Facility: CLINIC | Age: 45
End: 2025-02-07
Attending: EMERGENCY MEDICINE
Payer: COMMERCIAL

## 2025-02-07 ENCOUNTER — HOSPITAL ENCOUNTER (EMERGENCY)
Facility: CLINIC | Age: 45
Discharge: HOME | End: 2025-02-07
Attending: EMERGENCY MEDICINE | Admitting: EMERGENCY MEDICINE
Payer: COMMERCIAL

## 2025-02-07 VITALS
RESPIRATION RATE: 20 BRPM | SYSTOLIC BLOOD PRESSURE: 128 MMHG | TEMPERATURE: 98.6 F | DIASTOLIC BLOOD PRESSURE: 58 MMHG | OXYGEN SATURATION: 98 % | HEART RATE: 92 BPM

## 2025-02-07 DIAGNOSIS — J18.9 PNEUMONIA OF LEFT LOWER LOBE DUE TO INFECTIOUS ORGANISM: ICD-10-CM

## 2025-02-07 DIAGNOSIS — J10.1 INFLUENZA A: ICD-10-CM

## 2025-02-07 LAB
FLUAV RNA SPEC QL NAA+PROBE: POSITIVE
FLUBV RNA RESP QL NAA+PROBE: NEGATIVE
RSV RNA SPEC NAA+PROBE: NEGATIVE
S PYO DNA THROAT QL NAA+PROBE: NOT DETECTED
SARS-COV-2 RNA RESP QL NAA+PROBE: NEGATIVE

## 2025-02-07 PROCEDURE — 71046 X-RAY EXAM CHEST 2 VIEWS: CPT

## 2025-02-07 PROCEDURE — 87637 SARSCOV2&INF A&B&RSV AMP PRB: CPT | Performed by: EMERGENCY MEDICINE

## 2025-02-07 PROCEDURE — 250N000013 HC RX MED GY IP 250 OP 250 PS 637: Performed by: EMERGENCY MEDICINE

## 2025-02-07 PROCEDURE — 99284 EMERGENCY DEPT VISIT MOD MDM: CPT | Mod: 25

## 2025-02-07 PROCEDURE — 87651 STREP A DNA AMP PROBE: CPT | Performed by: EMERGENCY MEDICINE

## 2025-02-07 RX ORDER — AZITHROMYCIN 250 MG/1
500 TABLET, FILM COATED ORAL ONCE
Status: COMPLETED | OUTPATIENT
Start: 2025-02-07 | End: 2025-02-07

## 2025-02-07 RX ORDER — AZITHROMYCIN 250 MG/1
TABLET, FILM COATED ORAL
Qty: 6 TABLET | Refills: 0 | Status: SHIPPED | OUTPATIENT
Start: 2025-02-07 | End: 2025-02-12

## 2025-02-07 RX ORDER — IBUPROFEN 600 MG/1
600 TABLET, FILM COATED ORAL ONCE
Status: COMPLETED | OUTPATIENT
Start: 2025-02-07 | End: 2025-02-07

## 2025-02-07 RX ADMIN — IBUPROFEN 600 MG: 600 TABLET ORAL at 02:03

## 2025-02-07 RX ADMIN — AZITHROMYCIN DIHYDRATE 500 MG: 250 TABLET ORAL at 03:12

## 2025-02-07 ASSESSMENT — ACTIVITIES OF DAILY LIVING (ADL)
ADLS_ACUITY_SCORE: 45
ADLS_ACUITY_SCORE: 45

## 2025-02-07 NOTE — DISCHARGE INSTRUCTIONS
Return to the emergency department with worsened shortness of breath.    Your chest x-ray likely shows early developing pneumonia in the setting of influenza.  Antibiotics may not help with this but are being prescribed out of concern for a secondary bacterial infection.  Please make sure to monitor your symptoms closely and return for any new or worsening symptoms.    Make sure that you are masking when around others to avoid spread of infection.    If you feel your condition has changed or worsened, please call your doctor or return to the emergency department right away.

## 2025-02-07 NOTE — ED TRIAGE NOTES
Pt presents with cough, dyspnea, and intermittent fevers for approximately 1 week. Children sick at home with strep and Flu

## 2025-02-07 NOTE — ED PROVIDER NOTES
Emergency Department Note      History of Present Illness     Chief Complaint   Shortness of Breath and Cough      HPI   Rosaline Jimenez is a 44 year old female who presents with shortness of breath and cough. Last week the patient's children were diagnosed with flu, she assumed she had it as she had similar symptoms of cough, congestion, fatigue and felt like she was improving. She had taken tamiflu with this episode. She felt significantly better yesterday and had felt well enough to work. Tonight she developed a fever of 102 along with shortness of breath and chest tightness. Her shortness of breath prompted her to use her son's nebulizer around 2230 which did help. She denies any history of asthma.  She notes that her breathing does feel worse than during the time.  She originally thought that she had influenza.      Independent Historian   None    Review of External Notes   Reviewed urgent care note from 2/2/2025    Past Medical History     Medical History and Problem List   Anxiety   Cervical intraepithelial neoplasia I  Herpes genitalis   IUD   Migraine   Seizures    Tension headache     Medications   Neurontin   Mirena   Zoloft   Valtrex     Surgical History   Follicle punc, retrieval of oocyte for cryopreservation   [place] tooth extraction     Physical Exam     Patient Vitals for the past 24 hrs:   BP Temp Temp src Pulse Resp SpO2   02/07/25 0259 -- -- -- -- -- 98 %   02/07/25 0244 -- -- -- -- -- 97 %   02/07/25 0229 -- -- -- -- -- 99 %   02/07/25 0214 -- -- -- -- -- 99 %   02/07/25 0159 -- -- -- -- -- 98 %   02/07/25 0145 128/58 98.6  F (37  C) Temporal 92 20 100 %     Physical Exam  General: Alert, No distress. Nontoxic appearance  Head: No signs of trauma.   Mouth/Throat: Oropharynx moist.   Eyes: Conjunctivae are normal. Pupils are equal..   Neck: Normal range of motion.    CV: Appears well perfused.  Resp:lungs clear to osculations in all fields. Minimal coarseness in the bases bilaterally. No  wheezing.  MSK: Normal range of motion. No obvious deformity.   Neuro: The patient is alert and interactive. BELL. Speech normal. GCS 15  Skin: No lesion or sign of trauma noted.   Psych: normal mood and affect. behavior is normal.     Diagnostics     Lab Results   Labs Ordered and Resulted from Time of ED Arrival to Time of ED Departure   INFLUENZA A/B, RSV AND SARS-COV2 PCR - Abnormal       Result Value    Influenza A PCR Positive (*)     Influenza B PCR Negative      RSV PCR Negative      SARS CoV2 PCR Negative     GROUP A STREPTOCOCCUS PCR THROAT SWAB - Normal    Group A strep by PCR Not Detected         Imaging   Chest XR,  PA & LAT   Final Result   IMPRESSION: Minimal infiltrate at the left lung base posteromedially behind the heart could be seen with a mild or low-grade pneumonitis. Right lung is clear. Normal heart size and pulmonary vascularity. Remainder unremarkable.          EKG   None     ED Course      Medications Administered   Medications   ibuprofen (ADVIL/MOTRIN) tablet 600 mg (600 mg Oral $Given 2/7/25 0203)   azithromycin (ZITHROMAX) tablet 500 mg (500 mg Oral $Given 2/7/25 0312)     ED Course   ED Course as of 02/07/25 0531   Fri Feb 07, 2025   0154 I initially assessed the patient and obtained the above history and physical exam.      Additional Documentation  None    Medical Decision Making / Diagnosis     [facility]   Rosaline Jimenez is a 44 year old female who presents for evaluation of cough and fever. This is consistent with an influenza.  The patient has already done a round of [place] and had actually been recovering from influenza when her symptoms worsened.  She is at risk for pneumonia and may have pneumonia based on their XR and symptom timing.  Patient started on azithromycin as she does have an amoxicillin allergy.  Close followup of primary care physician is indicated and return to the ED for high fevers > 103 for more than 48 hours more, increasing productive cough, shortness of breath,  or confusion.  She is a physician and thus incredibly medically literate.  She will spot check with a pulse oximeter and return for any worsening shortness of breath.    Disposition   The patient was discharged.     Diagnosis     ICD-10-CM    1. Influenza A  J10.1       2. Pneumonia of left lower lobe due to infectious organism  J18.9            Discharge Medications   Discharge Medication List as of 2/7/2025  3:09 AM        START taking these medications    Details   azithromycin (ZITHROMAX Z-WILL) 250 MG tablet Two tablets on the first day, then one tablet daily for the next 4 days, Disp-6 tablet, R-0, E-Prescribe           Scribe Disclosure:  I, Dave Colindres, am serving as a scribe at 2:03 AM on 2/7/2025 to document services personally performed by Daniela Stock MD based on my observations and the provider's statements to me.        Daniela Stock MD  02/07/25 0619